# Patient Record
Sex: MALE | Race: OTHER | HISPANIC OR LATINO
[De-identification: names, ages, dates, MRNs, and addresses within clinical notes are randomized per-mention and may not be internally consistent; named-entity substitution may affect disease eponyms.]

---

## 2019-08-14 ENCOUNTER — RESULT CHARGE (OUTPATIENT)
Age: 44
End: 2019-08-14

## 2019-08-15 ENCOUNTER — OUTPATIENT (OUTPATIENT)
Dept: OUTPATIENT SERVICES | Facility: HOSPITAL | Age: 44
LOS: 1 days | End: 2019-08-15
Payer: SELF-PAY

## 2019-08-15 ENCOUNTER — APPOINTMENT (OUTPATIENT)
Dept: FAMILY MEDICINE | Facility: HOSPITAL | Age: 44
End: 2019-08-15

## 2019-08-15 VITALS
BODY MASS INDEX: 25.11 KG/M2 | RESPIRATION RATE: 16 BRPM | OXYGEN SATURATION: 98 % | DIASTOLIC BLOOD PRESSURE: 78 MMHG | TEMPERATURE: 98.3 F | SYSTOLIC BLOOD PRESSURE: 124 MMHG | HEART RATE: 88 BPM | WEIGHT: 144 LBS

## 2019-08-15 DIAGNOSIS — Z00.00 ENCOUNTER FOR GENERAL ADULT MEDICAL EXAMINATION WITHOUT ABNORMAL FINDINGS: ICD-10-CM

## 2019-08-15 PROCEDURE — 93005 ELECTROCARDIOGRAM TRACING: CPT

## 2019-08-15 PROCEDURE — G0463: CPT

## 2019-08-15 PROCEDURE — 36415 COLL VENOUS BLD VENIPUNCTURE: CPT

## 2019-08-15 NOTE — COUNSELING
[Risk of tobacco use and health benefits of smoking cessation discussed] : Risk of tobacco use and health benefits of smoking cessation discussed [Tobacco Use Cessation Intermediate Greater Than 3 Minutes Up to 10 Minutes] : Tobacco Use Cessation Intermediate Greater Than 3 Minutes Up to 10 Minutes [Encouraged to pick a quit date and identify support needed to quit] : Encouraged to pick a quit date and identify support needed to quit [FreeTextEntry1] : 8

## 2019-08-15 NOTE — REVIEW OF SYSTEMS
[Itching] : itching [Redness] : redness [Chest Pain] : chest pain [Joint Pain] : joint pain [Joint Stiffness] : joint stiffness [Headache] : headache [Dizziness] : dizziness [Negative] : Heme/Lymph [Palpitations] : no palpitations [Discharge] : no discharge [Lower Ext Edema] : no lower extremity edema [Leg Claudication] : no leg claudication [Orthopnea] : no orthopnea [Joint Swelling] : no joint swelling [Muscle Weakness] : no muscle weakness [Muscle Pain] : no muscle pain [Back Pain] : no back pain [Fainting] : no fainting [Confusion] : no confusion [Memory Loss] : no memory loss [Unsteady Walking] : no ataxia [FreeTextEntry5] : (see HPI) [FreeTextEntry3] : (see HPI)

## 2019-08-15 NOTE — HISTORY OF PRESENT ILLNESS
[FreeTextEntry8] : 43M w/o significant PMHx presenting w/ headache and joint pain. Headache started 6 months ago, occurs almost every day, lasts about 2 hours and is typically in morning. Headache starts in the bilateral temples and feels like a band to the back of the head. Takes advil 200 mg occasionally for the pain. Pt denies any visual changes associated with headaches. Pain does not wake him up from sleep. 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 Pt denies fever, night sweats, weight loss. \par \par Pt does report occasional pruritus w/ blurry vision lasting a few seconds. Associated with eye redness as well. \par \par Pt reports a few episodes of chest pain this week since he started smoking again. No active pain currently. Pain is retrosternal and pressure-like without radiation. The episodes occurred after long walking. Pt unsure if relieved by rest, but the pain subsided after half an hour. No associated diaphoresis or nausea. No family hx of heart disease or heart attacks.  independent

## 2019-08-15 NOTE — ASSESSMENT
[FreeTextEntry1] : 43M w/o significant PMHx presenting w/ headache, joint pain, episodic chest pain.

## 2019-08-15 NOTE — PLAN
[FreeTextEntry1] : #Tension headache\par - no red flags\par - advised ibuprofen 600 mg for headache, taking at start of headache is better than waiting for headache to get worse\par - discussed stretches\par - discussed relaxation techniques\par - discussed avoiding aggravating work if possible \par \par #Chest pain \par - atypical\par - EKG revealed sinus bradycardia, resolution of incomplete RBBB as compared to 2014 EKG, borderline 1st degree block \par - echo ordered\par - cardiology referral given\par - discussed w/ pt to go to ED if pt has chest pain \par - CBC, A1c, lipids, CMP \par \par #Joint pains\par - secondary to work \par - ibuprofen PRN \par - discussed avoiding aggravating work if possible \par \par #HCM \par - pt refusing tdap\par - HIV done today \par \par - RTC in 1 month for CPE and f/u of headache and chest pain \par \par d/w Dr. Munoz

## 2019-08-15 NOTE — PHYSICAL EXAM
[No Acute Distress] : no acute distress [Well Developed] : well developed [Well Nourished] : well nourished [Well-Appearing] : well-appearing [PERRL] : pupils equal round and reactive to light [Normal Outer Ear/Nose] : the outer ears and nose were normal in appearance [EOMI] : extraocular movements intact [Normal Oropharynx] : the oropharynx was normal [No Respiratory Distress] : no respiratory distress  [No Accessory Muscle Use] : no accessory muscle use [Clear to Auscultation] : lungs were clear to auscultation bilaterally [Normal Rate] : normal rate  [Regular Rhythm] : with a regular rhythm [Normal S1, S2] : normal S1 and S2 [No Murmur] : no murmur heard [No Carotid Bruits] : no carotid bruits [No Varicosities] : no varicosities [No Abdominal Bruit] : a ~M bruit was not heard ~T in the abdomen [Pedal Pulses Present] : the pedal pulses are present [No Edema] : there was no peripheral edema [No Palpable Aorta] : no palpable aorta [No Extremity Clubbing/Cyanosis] : no extremity clubbing/cyanosis [Soft] : abdomen soft [Non-distended] : non-distended [No Masses] : no abdominal mass palpated [Non Tender] : non-tender [No HSM] : no HSM [Normal Bowel Sounds] : normal bowel sounds [Normal Posterior Cervical Nodes] : no posterior cervical lymphadenopathy [Normal Anterior Cervical Nodes] : no anterior cervical lymphadenopathy [No CVA Tenderness] : no CVA  tenderness [No Spinal Tenderness] : no spinal tenderness [No Joint Swelling] : no joint swelling [Grossly Normal Strength/Tone] : grossly normal strength/tone [No Rash] : no rash [Coordination Grossly Intact] : coordination grossly intact [No Focal Deficits] : no focal deficits [Normal Gait] : normal gait [Normal Affect] : the affect was normal [Normal Insight/Judgement] : insight and judgment were intact [de-identified] : +bilateral ptergium

## 2019-08-16 DIAGNOSIS — G44.209 TENSION-TYPE HEADACHE, UNSPECIFIED, NOT INTRACTABLE: ICD-10-CM

## 2019-08-16 DIAGNOSIS — M25.50 PAIN IN UNSPECIFIED JOINT: ICD-10-CM

## 2019-08-16 DIAGNOSIS — R07.9 CHEST PAIN, UNSPECIFIED: ICD-10-CM

## 2019-08-16 LAB
ALBUMIN SERPL ELPH-MCNC: 4.9 G/DL
ALP BLD-CCNC: 82 U/L
ALT SERPL-CCNC: 49 U/L
ANION GAP SERPL CALC-SCNC: 13 MMOL/L
AST SERPL-CCNC: 27 U/L
BASOPHILS # BLD AUTO: 0.05 K/UL
BASOPHILS NFR BLD AUTO: 0.7 %
BILIRUB SERPL-MCNC: 0.7 MG/DL
BUN SERPL-MCNC: 22 MG/DL
CALCIUM SERPL-MCNC: 10.2 MG/DL
CHLORIDE SERPL-SCNC: 101 MMOL/L
CHOLEST SERPL-MCNC: 275 MG/DL
CHOLEST/HDLC SERPL: 3.5 RATIO
CO2 SERPL-SCNC: 27 MMOL/L
CREAT SERPL-MCNC: 0.94 MG/DL
EOSINOPHIL # BLD AUTO: 0.16 K/UL
EOSINOPHIL NFR BLD AUTO: 2.1 %
ESTIMATED AVERAGE GLUCOSE: 108 MG/DL
GLUCOSE SERPL-MCNC: 98 MG/DL
HBA1C MFR BLD HPLC: 5.4 %
HCT VFR BLD CALC: 51.5 %
HDLC SERPL-MCNC: 78 MG/DL
HGB BLD-MCNC: 17.3 G/DL
HIV1+2 AB SPEC QL IA.RAPID: NONREACTIVE
IMM GRANULOCYTES NFR BLD AUTO: 0.3 %
LDLC SERPL CALC-MCNC: 175 MG/DL
LYMPHOCYTES # BLD AUTO: 2.45 K/UL
LYMPHOCYTES NFR BLD AUTO: 32.6 %
MAN DIFF?: NORMAL
MCHC RBC-ENTMCNC: 31.3 PG
MCHC RBC-ENTMCNC: 33.6 GM/DL
MCV RBC AUTO: 93.3 FL
MONOCYTES # BLD AUTO: 0.82 K/UL
MONOCYTES NFR BLD AUTO: 10.9 %
NEUTROPHILS # BLD AUTO: 4.02 K/UL
NEUTROPHILS NFR BLD AUTO: 53.4 %
PLATELET # BLD AUTO: 329 K/UL
POTASSIUM SERPL-SCNC: 4.7 MMOL/L
PROT SERPL-MCNC: 7.9 G/DL
RBC # BLD: 5.52 M/UL
RBC # FLD: 13 %
SODIUM SERPL-SCNC: 141 MMOL/L
TRIGL SERPL-MCNC: 111 MG/DL
WBC # FLD AUTO: 7.52 K/UL

## 2019-08-20 ENCOUNTER — OUTPATIENT (OUTPATIENT)
Dept: OUTPATIENT SERVICES | Facility: HOSPITAL | Age: 44
LOS: 1 days | End: 2019-08-20
Payer: SELF-PAY

## 2019-08-20 DIAGNOSIS — R07.9 CHEST PAIN, UNSPECIFIED: ICD-10-CM

## 2019-08-20 DIAGNOSIS — Z00.00 ENCOUNTER FOR GENERAL ADULT MEDICAL EXAMINATION WITHOUT ABNORMAL FINDINGS: ICD-10-CM

## 2019-08-20 PROCEDURE — 93306 TTE W/DOPPLER COMPLETE: CPT

## 2019-09-19 ENCOUNTER — FORM ENCOUNTER (OUTPATIENT)
Age: 44
End: 2019-09-19

## 2019-09-20 ENCOUNTER — OUTPATIENT (OUTPATIENT)
Dept: OUTPATIENT SERVICES | Facility: HOSPITAL | Age: 44
LOS: 1 days | End: 2019-09-20
Payer: SELF-PAY

## 2019-09-20 ENCOUNTER — APPOINTMENT (OUTPATIENT)
Dept: CARDIOLOGY | Facility: HOSPITAL | Age: 44
End: 2019-09-20
Payer: SELF-PAY

## 2019-09-20 VITALS
DIASTOLIC BLOOD PRESSURE: 70 MMHG | RESPIRATION RATE: 15 BRPM | WEIGHT: 144 LBS | SYSTOLIC BLOOD PRESSURE: 124 MMHG | HEART RATE: 84 BPM | BODY MASS INDEX: 25.11 KG/M2 | TEMPERATURE: 97.8 F | OXYGEN SATURATION: 99 %

## 2019-09-20 DIAGNOSIS — Z00.00 ENCOUNTER FOR GENERAL ADULT MEDICAL EXAMINATION WITHOUT ABNORMAL FINDINGS: ICD-10-CM

## 2019-09-20 PROCEDURE — 71047 X-RAY EXAM CHEST 3 VIEWS: CPT | Mod: 26

## 2019-09-23 DIAGNOSIS — E78.5 HYPERLIPIDEMIA, UNSPECIFIED: ICD-10-CM

## 2019-09-23 DIAGNOSIS — R07.9 CHEST PAIN, UNSPECIFIED: ICD-10-CM

## 2019-09-23 PROCEDURE — 93017 CV STRESS TEST TRACING ONLY: CPT

## 2019-09-23 PROCEDURE — 93018 CV STRESS TEST I&R ONLY: CPT

## 2019-09-23 PROCEDURE — 71047 X-RAY EXAM CHEST 3 VIEWS: CPT

## 2019-09-23 PROCEDURE — G0463: CPT

## 2019-09-23 PROCEDURE — 93016 CV STRESS TEST SUPVJ ONLY: CPT

## 2019-09-27 ENCOUNTER — OUTPATIENT (OUTPATIENT)
Dept: OUTPATIENT SERVICES | Facility: HOSPITAL | Age: 44
LOS: 1 days | End: 2019-09-27
Payer: SELF-PAY

## 2019-09-27 DIAGNOSIS — Z87.891 PERSONAL HISTORY OF NICOTINE DEPENDENCE: ICD-10-CM

## 2019-09-27 PROCEDURE — 94640 AIRWAY INHALATION TREATMENT: CPT

## 2019-09-27 PROCEDURE — 94060 EVALUATION OF WHEEZING: CPT

## 2019-09-27 RX ORDER — ALBUTEROL 90 UG/1
2.5 AEROSOL, METERED ORAL ONCE
Refills: 0 | Status: COMPLETED | OUTPATIENT
Start: 2019-09-27 | End: 2019-09-27

## 2019-09-27 RX ADMIN — ALBUTEROL 2.5 MILLIGRAM(S): 90 AEROSOL, METERED ORAL at 14:01

## 2019-09-30 ENCOUNTER — APPOINTMENT (OUTPATIENT)
Dept: FAMILY MEDICINE | Facility: HOSPITAL | Age: 44
End: 2019-09-30

## 2019-09-30 ENCOUNTER — OUTPATIENT (OUTPATIENT)
Dept: OUTPATIENT SERVICES | Facility: HOSPITAL | Age: 44
LOS: 1 days | End: 2019-09-30
Payer: SELF-PAY

## 2019-09-30 VITALS
DIASTOLIC BLOOD PRESSURE: 65 MMHG | TEMPERATURE: 97.7 F | HEART RATE: 55 BPM | SYSTOLIC BLOOD PRESSURE: 109 MMHG | BODY MASS INDEX: 24.93 KG/M2 | WEIGHT: 143 LBS | OXYGEN SATURATION: 99 % | RESPIRATION RATE: 15 BRPM

## 2019-09-30 DIAGNOSIS — Z80.1 FAMILY HISTORY OF MALIGNANT NEOPLASM OF TRACHEA, BRONCHUS AND LUNG: ICD-10-CM

## 2019-09-30 DIAGNOSIS — Z87.891 PERSONAL HISTORY OF NICOTINE DEPENDENCE: ICD-10-CM

## 2019-09-30 DIAGNOSIS — Z82.49 FAMILY HISTORY OF ISCHEMIC HEART DISEASE AND OTHER DISEASES OF THE CIRCULATORY SYSTEM: ICD-10-CM

## 2019-09-30 DIAGNOSIS — Z00.00 ENCOUNTER FOR GENERAL ADULT MEDICAL EXAMINATION WITHOUT ABNORMAL FINDINGS: ICD-10-CM

## 2019-09-30 PROCEDURE — 86038 ANTINUCLEAR ANTIBODIES: CPT

## 2019-09-30 PROCEDURE — G0463: CPT

## 2019-09-30 PROCEDURE — 36415 COLL VENOUS BLD VENIPUNCTURE: CPT

## 2019-09-30 NOTE — PLAN
[FreeTextEntry1] : #Tension Headache\par - continue stretches, relaxation techniques\par - continue advil as needed\par \par #Chest pain\par - stress test per pt was normal\par - f/u cardio \par - discussed going to ED if pt has chest pain \par \par #Severe pulmonary hypertension\par - h/o smoking\par - CXR (9/20/19) wnl\par - PFTs results pending - per cardio\par - pulmonology referral given \par - MARIOLA ordered\par \par #Far-sighted\par - ophthalmology referral given\par \par #Smoking cessation\par - discussed completely stopping tobacco as he only uses it\par - encouraged pt as he is no longer chemically dependent on it \par \par #HCM \par - tdap given today \par - pt deferred flu shot and pneumovax\par - dental referral given \par - screenings up to date\par \par RTC in 2 months for f/u severe pulmonary hypertension workup

## 2019-09-30 NOTE — PHYSICAL EXAM
[No Acute Distress] : no acute distress [Well Nourished] : well nourished [Well Developed] : well developed [Well-Appearing] : well-appearing [Normal Sclera/Conjunctiva] : normal sclera/conjunctiva [PERRL] : pupils equal round and reactive to light [EOMI] : extraocular movements intact [Normal Outer Ear/Nose] : the outer ears and nose were normal in appearance [Normal Oropharynx] : the oropharynx was normal [No JVD] : no jugular venous distention [No Lymphadenopathy] : no lymphadenopathy [Supple] : supple [Thyroid Normal, No Nodules] : the thyroid was normal and there were no nodules present [No Respiratory Distress] : no respiratory distress  [No Accessory Muscle Use] : no accessory muscle use [Clear to Auscultation] : lungs were clear to auscultation bilaterally [Normal Rate] : normal rate  [Regular Rhythm] : with a regular rhythm [Normal S1, S2] : normal S1 and S2 [No Murmur] : no murmur heard [Soft] : abdomen soft [Non-distended] : non-distended [Non Tender] : non-tender [No HSM] : no HSM [Normal Bowel Sounds] : normal bowel sounds [Normal Posterior Cervical Nodes] : no posterior cervical lymphadenopathy [No CVA Tenderness] : no CVA  tenderness [Normal Anterior Cervical Nodes] : no anterior cervical lymphadenopathy [No Joint Swelling] : no joint swelling [No Spinal Tenderness] : no spinal tenderness [Grossly Normal Strength/Tone] : grossly normal strength/tone [Normal Gait] : normal gait [No Focal Deficits] : no focal deficits [Coordination Grossly Intact] : coordination grossly intact [Normal Affect] : the affect was normal [Normal Insight/Judgement] : insight and judgment were intact

## 2019-09-30 NOTE — HISTORY OF PRESENT ILLNESS
[de-identified] : 43M w/ headache, HLD, occasional chest pain presenting for CPE. Pt went to cardio and has stress pending. Echo revealed severe pulmonary hypertension. CXR revealed no acute pathology. No acute chest pain currently, but pt still occasionally gets chest pain. No change in pain or quality. Pt had PFTs done 3 days ago and stress test done and was told it was good. Headaches much better. \par \par Pt reports he wants to be an organ donor and that he has talked to his wife about it.

## 2019-09-30 NOTE — HEALTH RISK ASSESSMENT
[21-25] : 21-25 [] : Yes [No falls in past year] : Patient reported no falls in the past year [With Significant Other] : lives with significant other [# of Members in Household ___] :  household currently consist of [unfilled] member(s) [Sexually Active] : sexually active [] :  [Employed] : employed [Fully functional (bathing, dressing, toileting, transferring, walking, feeding)] : Fully functional (bathing, dressing, toileting, transferring, walking, feeding) [Feels Safe at Home] : Feels safe at home [Fully functional (using the telephone, shopping, preparing meals, housekeeping, doing laundry, using] : Fully functional and needs no help or supervision to perform IADLs (using the telephone, shopping, preparing meals, housekeeping, doing laundry, using transportation, managing medications and managing finances) [Reports changes in hearing] : Reports changes in hearing [Reports changes in vision] : Reports changes in vision [Smoke Detector] : smoke detector [Seat Belt] :  uses seat belt [With Patient/Caregiver] : With Patient/Caregiver [Designated Healthcare Proxy] : Designated healthcare proxy [Name: ___] : Health Care Proxy's Name: [unfilled]  [Relationship: ___] : Relationship: [unfilled] [Aggressive treatment] : aggressive treatment [de-identified] : occasionally smokes 1 cigarette w/ friends  [YearQuit] : 2 [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

## 2019-10-01 DIAGNOSIS — I27.20 PULMONARY HYPERTENSION, UNSPECIFIED: ICD-10-CM

## 2019-10-01 DIAGNOSIS — Z23 ENCOUNTER FOR IMMUNIZATION: ICD-10-CM

## 2019-10-01 DIAGNOSIS — H52.00 HYPERMETROPIA, UNSPECIFIED EYE: ICD-10-CM

## 2019-10-04 LAB — ANA SER IF-ACNC: NEGATIVE

## 2019-10-21 ENCOUNTER — APPOINTMENT (OUTPATIENT)
Dept: OPHTHALMOLOGY | Facility: CLINIC | Age: 44
End: 2019-10-21

## 2019-10-21 ENCOUNTER — OUTPATIENT (OUTPATIENT)
Dept: OUTPATIENT SERVICES | Facility: HOSPITAL | Age: 44
LOS: 1 days | End: 2019-10-21

## 2019-10-21 ENCOUNTER — APPOINTMENT (OUTPATIENT)
Dept: FAMILY MEDICINE | Facility: HOSPITAL | Age: 44
End: 2019-10-21

## 2019-10-21 DIAGNOSIS — E78.5 HYPERLIPIDEMIA, UNSPECIFIED: ICD-10-CM

## 2019-10-21 PROCEDURE — G0463: CPT

## 2019-10-22 DIAGNOSIS — Z00.00 ENCOUNTER FOR GENERAL ADULT MEDICAL EXAMINATION WITHOUT ABNORMAL FINDINGS: ICD-10-CM

## 2019-10-22 DIAGNOSIS — Z23 ENCOUNTER FOR IMMUNIZATION: ICD-10-CM

## 2019-10-22 DIAGNOSIS — I27.20 PULMONARY HYPERTENSION, UNSPECIFIED: ICD-10-CM

## 2019-10-22 DIAGNOSIS — H52.00 HYPERMETROPIA, UNSPECIFIED EYE: ICD-10-CM

## 2019-11-13 ENCOUNTER — APPOINTMENT (OUTPATIENT)
Dept: PULMONOLOGY | Facility: CLINIC | Age: 44
End: 2019-11-13
Payer: COMMERCIAL

## 2019-11-13 VITALS
BODY MASS INDEX: 24.45 KG/M2 | RESPIRATION RATE: 16 BRPM | HEIGHT: 63 IN | OXYGEN SATURATION: 98 % | HEART RATE: 66 BPM | WEIGHT: 138 LBS | DIASTOLIC BLOOD PRESSURE: 65 MMHG | SYSTOLIC BLOOD PRESSURE: 113 MMHG | TEMPERATURE: 98 F

## 2019-11-13 PROCEDURE — 94729 DIFFUSING CAPACITY: CPT

## 2019-11-13 PROCEDURE — 94726 PLETHYSMOGRAPHY LUNG VOLUMES: CPT

## 2019-11-13 PROCEDURE — 94060 EVALUATION OF WHEEZING: CPT

## 2019-11-13 PROCEDURE — ZZZZZ: CPT

## 2019-11-13 PROCEDURE — 99203 OFFICE O/P NEW LOW 30 MIN: CPT | Mod: 25

## 2019-11-13 NOTE — ASSESSMENT
[FreeTextEntry1] : 42 yo man with hyperlipidemia and former smoker referred from [facility] for evaluation of pulmonary hypertension. \par \par 1. 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 but otherwise no signs or symptoms suggestive of connective tissue diseases\par - No history of drug use, stimulants or weight loss medications\par - HIV negative\par - No history of PE/DVT\par - No history of renal or liver disease\par \par Plan:\par - Cardiology referral for right /left heart cath to confirm diagnosis of pulmonary hypertension\par - Based on results, will decide on further work up including rheumatologic serologies and V/Q scan to exclude potential causes of PH\par - He does have polycythemia - reports smoking history but not hypoxic on exam and did not live in high altitudes back in [facility] - will get oximetry during right heart cath as well and consider further testing with EPO and JAK2 mutation next visit\par Does not have any signs or symptoms  of PAH

## 2019-11-13 NOTE — PHYSICAL EXAM
[General Appearance - Well Developed] : well developed [Normal Appearance] : normal appearance [Normal Conjunctiva] : the conjunctiva exhibited no abnormalities [Neck Appearance] : the appearance of the neck was normal [Jugular Venous Distention Increased] : there was no jugular-venous distention [Heart Rate And Rhythm] : heart rate and rhythm were normal [Heart Sounds] : normal S1 and S2 [Murmurs] : no murmurs present [Edema] : no peripheral edema present [Bowel Sounds] : normal bowel sounds [Auscultation Breath Sounds / Voice Sounds] : lungs were clear to auscultation bilaterally [Abdomen Soft] : soft [Nail Clubbing] : no clubbing of the fingernails [Abnormal Walk] : normal gait [Abdomen Tenderness] : non-tender [] : no rash [Cyanosis, Localized] : no localized cyanosis [No Focal Deficits] : no focal deficits [Oriented To Time, Place, And Person] : oriented to person, place, and time [Normal Oropharynx] : abnormal oropharynx [Erythema] : no erythema of the pharynx

## 2019-11-13 NOTE — REVIEW OF SYSTEMS
[Myalgias] : myalgias [Arthralgias] : arthralgias [Negative] : Sleep Disorder [Fever] : no fever [Chills] : no chills [Fatigue] : no fatigue [Poor Appetite] : normal appetite  [Dry Eyes] : no dryness of the eyes [Recent Wt Loss (___ Lbs)] : no recent weight loss [Eye Irritation] : no ~T irritation of the eyes [Nasal Congestion] : no nasal congestion [Ear Disturbance] : no ear disturbance [Epistaxis] : no nosebleeds [Sinus Problems] : no sinus problems [Postnasal Drip] : no postnasal drip [Dry Mouth] : no dry mouth [Mouth Ulcers] : no mouth ulcers [Sore Throat] : no sore throat [Chest Discomfort] : no chest discomfort [Hypotension] : no hypotension [Hypertension] : no ~T hypertension [Murmurs] : no murmurs were heard [Dysrhythmia] : no dysrhythmia [Orthopnea] : no orthopnea [PND] : no PND [Palpitations] : no palpitations [Claudication] : no intermittent claudication [Edema] : ~T edema was not present [Phlebitis] : no thrombophlebitis [Heartburn] : no heartburn [Leg Cramps] : no leg cramps [Indigestion] : no indigestion [Dysphagia] : no dysphagia [Reflux] : no reflux [Nausea] : no nausea [Constipation] : no constipation [Vomiting] : no vomiting [Diarrhea] : no diarrhea [Nocturia] : no nocturia [Abdominal Pain] : no abdominal pain [Urgency] : no feelings of urinary urgency [Frequency] : no change in urinary frequency [Dysuria] : no dysuria [Ulcerations] : no ulcerations [Rash] : no [unfilled] rash [Raynaud] : no Raynaud's phenomenon was observed [Anemia] : no anemia [Easy Bruising] : no ~M tendency for easy bruising [Clotting Disorder] : no clotting disorder [Headache] : no headache [Dizziness] : no dizziness [Syncope] : no fainting [Depression] : no depression [Anxiety] : no anxiety [Thyroid Problem] : no thyroid problem [Diabetes] : no diabetes mellitus [Diet Meds] : not taking dietary supplements [Rheumatologic] : no ~T rheumatologic disorder [HIV] : no HIV infection [Vasculitis] : no ~T vasculitides [DVT] : no DVT [Hepatic Disease] : no hepatic disease [Pulmonary Embolism] : no pulmonary embolism [Family History of PH] : no family history of pulmonary hypertension

## 2019-11-13 NOTE — HISTORY OF PRESENT ILLNESS
[FreeTextEntry1] : 44 yo man with hyperlipidemia and former smoker referred from [facility] for evaluation of pulmonary hypertension. He was initially seen for evaluation of chest pain. Was evaluated by cardiology and echocardiogram revealed estimated RVSP 65 mmHg with no evidence of left heart dysfunction. Apart from intermittent chest pain, reports no dyspnea on exertion. No signs or symptoms suggestive of connective tissue diseases. History of smoking but no known COPD. \par \par Smoking history: 1 pack per day for about 20 years then quit for 5 years and resumed for about 5 years. Quit 1 year ago\par Alcohol use: occasional\par Drug use: negative\par \par

## 2019-12-11 ENCOUNTER — OUTPATIENT (OUTPATIENT)
Dept: OUTPATIENT SERVICES | Facility: HOSPITAL | Age: 44
LOS: 1 days | End: 2019-12-11
Payer: SELF-PAY

## 2019-12-11 VITALS
DIASTOLIC BLOOD PRESSURE: 58 MMHG | TEMPERATURE: 98 F | OXYGEN SATURATION: 97 % | RESPIRATION RATE: 18 BRPM | HEIGHT: 63 IN | SYSTOLIC BLOOD PRESSURE: 110 MMHG | HEART RATE: 68 BPM | WEIGHT: 145.06 LBS

## 2019-12-11 DIAGNOSIS — I27.20 PULMONARY HYPERTENSION, UNSPECIFIED: ICD-10-CM

## 2019-12-11 LAB
ALBUMIN SERPL ELPH-MCNC: 4.3 G/DL — SIGNIFICANT CHANGE UP (ref 3.3–5)
ALP SERPL-CCNC: 92 U/L — SIGNIFICANT CHANGE UP (ref 40–120)
ALT FLD-CCNC: 52 U/L — HIGH (ref 10–45)
ANION GAP SERPL CALC-SCNC: 15 MMOL/L — SIGNIFICANT CHANGE UP (ref 5–17)
AST SERPL-CCNC: 25 U/L — SIGNIFICANT CHANGE UP (ref 10–40)
BILIRUB SERPL-MCNC: 0.5 MG/DL — SIGNIFICANT CHANGE UP (ref 0.2–1.2)
BUN SERPL-MCNC: 28 MG/DL — HIGH (ref 7–23)
CALCIUM SERPL-MCNC: 9.7 MG/DL — SIGNIFICANT CHANGE UP (ref 8.4–10.5)
CHLORIDE SERPL-SCNC: 102 MMOL/L — SIGNIFICANT CHANGE UP (ref 96–108)
CO2 SERPL-SCNC: 23 MMOL/L — SIGNIFICANT CHANGE UP (ref 22–31)
CREAT SERPL-MCNC: 0.82 MG/DL — SIGNIFICANT CHANGE UP (ref 0.5–1.3)
GLUCOSE SERPL-MCNC: 94 MG/DL — SIGNIFICANT CHANGE UP (ref 70–99)
HCT VFR BLD CALC: 44.4 % — SIGNIFICANT CHANGE UP (ref 39–50)
HGB BLD-MCNC: 14.8 G/DL — SIGNIFICANT CHANGE UP (ref 13–17)
MCHC RBC-ENTMCNC: 30.6 PG — SIGNIFICANT CHANGE UP (ref 27–34)
MCHC RBC-ENTMCNC: 33.3 GM/DL — SIGNIFICANT CHANGE UP (ref 32–36)
MCV RBC AUTO: 91.7 FL — SIGNIFICANT CHANGE UP (ref 80–100)
NRBC # BLD: 0 /100 WBCS — SIGNIFICANT CHANGE UP (ref 0–0)
PLATELET # BLD AUTO: 289 K/UL — SIGNIFICANT CHANGE UP (ref 150–400)
POTASSIUM SERPL-MCNC: 3.9 MMOL/L — SIGNIFICANT CHANGE UP (ref 3.5–5.3)
POTASSIUM SERPL-SCNC: 3.9 MMOL/L — SIGNIFICANT CHANGE UP (ref 3.5–5.3)
PROT SERPL-MCNC: 7.6 G/DL — SIGNIFICANT CHANGE UP (ref 6–8.3)
RBC # BLD: 4.84 M/UL — SIGNIFICANT CHANGE UP (ref 4.2–5.8)
RBC # FLD: 12.1 % — SIGNIFICANT CHANGE UP (ref 10.3–14.5)
SODIUM SERPL-SCNC: 140 MMOL/L — SIGNIFICANT CHANGE UP (ref 135–145)
WBC # BLD: 6.91 K/UL — SIGNIFICANT CHANGE UP (ref 3.8–10.5)
WBC # FLD AUTO: 6.91 K/UL — SIGNIFICANT CHANGE UP (ref 3.8–10.5)

## 2019-12-11 PROCEDURE — C1894: CPT

## 2019-12-11 PROCEDURE — 93005 ELECTROCARDIOGRAM TRACING: CPT

## 2019-12-11 PROCEDURE — 93451 RIGHT HEART CATH: CPT | Mod: 26

## 2019-12-11 PROCEDURE — 93451 RIGHT HEART CATH: CPT

## 2019-12-11 PROCEDURE — 85027 COMPLETE CBC AUTOMATED: CPT

## 2019-12-11 PROCEDURE — 80053 COMPREHEN METABOLIC PANEL: CPT

## 2019-12-11 PROCEDURE — C1769: CPT

## 2019-12-11 PROCEDURE — 93010 ELECTROCARDIOGRAM REPORT: CPT

## 2019-12-11 NOTE — H&P CARDIOLOGY - HISTORY OF PRESENT ILLNESS
45 yo male with h/o HLD, smoker (reports 20yrs of smoking, quit and started again, now smoking ~1-2 cigarettes/week), referred to cardiology clinic for complaints of chest pain, and for result of most recent echocardiogram. Pt initial complaint of chest pain was ~1 month ago. Pt was evaluated at  clinic at that time, EKG obtained with sinus bradycardia, borderline 1st degree block. Pt was sent for subsequent echo. He reports occasional feeling of chest pain, especially after coming back from work. Pt works in construction. He described the chest pain as pressure-like, and with radiation to the neck and left arm.   Echo on 8/20/19 revealed findings consistent with severe pulmonary hypertension, also showed mildly enlarged RV size, mildly enlarged R atrium. Pt was seen by Dr Bender Pulmonary for severe Pulmonary HTN. Pt was referred for RHC by DR Bender. 43 yo male with h/o HLD (no meds) , referred to cardiology clinic for complaints of chest pain, and for result of most recent echocardiogram. Pt initial complaint of chest pain was ~1 month ago. Pt was evaluated at  clinic at that time, EKG obtained with sinus bradycardia, borderline 1st degree block. Pt was sent for subsequent echo. He reports occasional feeling of chest pain, especially after coming back from work. Pt works in construction. He described the chest pain as pressure-like, and with radiation to the neck and left arm. Pt denies any implantable devices.   Echo on 8/20/19 revealed findings consistent with severe pulmonary hypertension, also showed mildly enlarged RV size, mildly enlarged R atrium. Pt was seen by Maurilio Encinas (Pulmonary) for severe Pulmonary HTN. Pt was referred for RHC by DR Bender.

## 2019-12-12 ENCOUNTER — TRANSCRIPTION ENCOUNTER (OUTPATIENT)
Age: 44
End: 2019-12-12

## 2019-12-12 PROBLEM — E78.5 HYPERLIPIDEMIA, UNSPECIFIED: Chronic | Status: ACTIVE | Noted: 2019-12-11

## 2020-01-08 ENCOUNTER — APPOINTMENT (OUTPATIENT)
Dept: PULMONOLOGY | Facility: CLINIC | Age: 45
End: 2020-01-08
Payer: COMMERCIAL

## 2020-01-08 VITALS
DIASTOLIC BLOOD PRESSURE: 70 MMHG | TEMPERATURE: 97.9 F | BODY MASS INDEX: 25.34 KG/M2 | WEIGHT: 143 LBS | HEIGHT: 63 IN | SYSTOLIC BLOOD PRESSURE: 114 MMHG | RESPIRATION RATE: 15 BRPM | HEART RATE: 60 BPM

## 2020-01-08 DIAGNOSIS — I27.20 PULMONARY HYPERTENSION, UNSPECIFIED: ICD-10-CM

## 2020-01-08 PROCEDURE — 99213 OFFICE O/P EST LOW 20 MIN: CPT | Mod: GC

## 2020-01-08 NOTE — REVIEW OF SYSTEMS
[Chest Tightness] : chest tightness [Chest Discomfort] : chest discomfort [Reflux] : reflux [Headache] : headache [Fever] : no fever [Fatigue] : no fatigue [Chills] : no chills [Sinus Problems] : no sinus problems [Nasal Congestion] : no nasal congestion [Postnasal Drip] : no postnasal drip [Cough] : no cough [Sputum] : not coughing up ~M sputum [Sore Throat] : no sore throat [Hemoptysis] : no hemoptysis [Dyspnea] : no dyspnea [Orthopnea] : no orthopnea [Wheezing] : no wheezing [Pleuritic Pain] : no pleuritic pain [Palpitations] : no palpitations [Edema] : ~T edema was not present [Watery Eyes] : no discharge from the eyes [Nasal Discharge] : no nasal discharge [Nausea] : no nausea [Diarrhea] : no diarrhea [Abdominal Pain] : no abdominal pain [Vomiting] : no vomiting [Myalgias] : no myalgias [Dizziness] : no dizziness [Arthralgias] : no arthralgias [Seizures] : no seizures [Numbness] : no numbness

## 2020-01-08 NOTE — ASSESSMENT
[FreeTextEntry1] : This is a 45 y/o M former smoker with HLD who presents for evaluation of pulmonary hypertension from high estimated RVSP on TTE. RHC performed in December reveals PASP 25, mean pulmonary artery pressure 15, PCWP 8, consistent with normal pulmonary pressures.\par \par - based on RHC, patient does not have pulmonary hypertension\par - told to f/u with PCP for further workup of intermittent chest pain\par - told he can try OTC pepcid to treat symptoms of reflux\par - discussed lifestyle changes to help lower cholesterol, told to f/u with PCP for repeat lipid profile within the next several months

## 2020-01-08 NOTE — HISTORY OF PRESENT ILLNESS
[FreeTextEntry1] : Patient is a 43 y/o M former smoker with PMHx of HLD who presents for f/u for evaluation of pulmonary hypertension.\par \par Patient had TTE performed at [facility] showing normal left ventricular function, with mildly dilated RA and RV and estimated RVSP of 65 mmHg. He was sent here for evaluation last month. He had RHC performed in December which reveals normal pulmonary artery pressures.\par \par Since last visit, patient reports he is doing well. Continues to abstain from smoking. Has intermittent chest pain that is slightly improved. Describes pain as achy, like a muscle ache, and located substernally. Worse when lying down for sleep, but also has pain randomly at rest and also during exertion. Workup thus far has been nondiagnostic for cause of pain. Also reports recent history of URI, now resolved.

## 2020-01-08 NOTE — PHYSICAL EXAM
[General Appearance - Well Developed] : well developed [Normal Appearance] : normal appearance [Normal Conjunctiva] : the conjunctiva exhibited no abnormalities [Well Groomed] : well groomed [General Appearance - Well Nourished] : well nourished [Normal Oropharynx] : normal oropharynx [Neck Appearance] : the appearance of the neck was normal [Jugular Venous Distention Increased] : there was no jugular-venous distention [Neck Cervical Mass (___cm)] : no neck mass was observed [Heart Sounds] : normal S1 and S2 [Heart Rate And Rhythm] : heart rate and rhythm were normal [Murmurs] : no murmurs present [Edema] : no peripheral edema present [Arterial Pulses Normal] : the arterial pulses were normal [Auscultation Breath Sounds / Voice Sounds] : lungs were clear to auscultation bilaterally [Respiration, Rhythm And Depth] : normal respiratory rhythm and effort [Exaggerated Use Of Accessory Muscles For Inspiration] : no accessory muscle use [Abdomen Tenderness] : non-tender [Abdomen Soft] : soft [Abnormal Walk] : normal gait [Cyanosis, Localized] : no localized cyanosis [Nail Clubbing] : no clubbing of the fingernails [] : no rash [No Focal Deficits] : no focal deficits [Skin Turgor] : normal skin turgor [Oriented To Time, Place, And Person] : oriented to person, place, and time [Affect] : the affect was normal [Erythema] : no erythema of the pharynx

## 2020-02-12 ENCOUNTER — OUTPATIENT (OUTPATIENT)
Dept: OUTPATIENT SERVICES | Facility: HOSPITAL | Age: 45
LOS: 1 days | End: 2020-02-12
Payer: SELF-PAY

## 2020-02-12 ENCOUNTER — APPOINTMENT (OUTPATIENT)
Dept: FAMILY MEDICINE | Facility: HOSPITAL | Age: 45
End: 2020-02-12

## 2020-02-12 VITALS
TEMPERATURE: 97.5 F | SYSTOLIC BLOOD PRESSURE: 127 MMHG | WEIGHT: 141 LBS | RESPIRATION RATE: 16 BRPM | OXYGEN SATURATION: 98 % | BODY MASS INDEX: 24.98 KG/M2 | HEART RATE: 63 BPM | DIASTOLIC BLOOD PRESSURE: 70 MMHG

## 2020-02-12 DIAGNOSIS — Z00.00 ENCOUNTER FOR GENERAL ADULT MEDICAL EXAMINATION WITHOUT ABNORMAL FINDINGS: ICD-10-CM

## 2020-02-12 PROCEDURE — G0463: CPT

## 2020-02-12 NOTE — REVIEW OF SYSTEMS
[Chills] : no chills [Fever] : no fever [Palpitations] : no palpitations [Chest Pain] : no chest pain [Orthopena] : no orthopnea [Shortness Of Breath] : no shortness of breath [Cough] : no cough [Nausea] : no nausea [Abdominal Pain] : no abdominal pain [Vomiting] : no vomiting

## 2020-02-12 NOTE — ASSESSMENT
[FreeTextEntry1] : 43M w/ headache, HLD, occasional chest pain following up for intermittent chest pain

## 2020-02-12 NOTE — COUNSELING
[Risk of tobacco use and health benefits of smoking cessation discussed] : Risk of tobacco use and health benefits of smoking cessation discussed [Cessation strategies including cessation program discussed] : Cessation strategies including cessation program discussed [Use of nicotine replacement therapies and other medications discussed] : Use of nicotine replacement therapies and other medications discussed [Willing to Quit Smoking] : Willing to quit smoking [FreeTextEntry2] : pt has gone 3 months without smoking. does not want medication at this time. pt congratulated for cessation and encouraged to continue.

## 2020-02-12 NOTE — PLAN
[FreeTextEntry1] : #Chest pain\par - improving\par - stress test normal \par - likely MSK related \par - minimize risk via cholesterol control and smoking cessation \par \par #HLD\par - repeat LDL \par - pt would like lifestyle modifcations first\par - will repeat in 3 months after modifications before determining meds use \par \par #Severe pulmonary hypertension\par - false-positive\par - right heart cath revealed normal pressures\par \par #Smoking cessation\par - hasn't smoked for 3 months\par - supported and encouraged pt to continue cessation\par \par #HCM \par - pneumovax given today\par - pt deferred flu shot\par - screenings up to date\par \par RTC in 3 months for f/u HLD \par D/W Dr. Cortez\par

## 2020-02-12 NOTE — HISTORY OF PRESENT ILLNESS
[de-identified] : 43M w/ headache, HLD, occasional chest pain following up for intermittent chest pain. Chest pain is less frequent now, 3-4x/week. Pain feels like "leg pain after walking a lot", like "stretching". Occasionally upon waking and sometimes exertional and associated with stress.. Lasts 3 minutes and resolves with relaxation. No radiation. No associated diaphoresis, palpitations.

## 2020-02-13 DIAGNOSIS — F17.200 NICOTINE DEPENDENCE, UNSPECIFIED, UNCOMPLICATED: ICD-10-CM

## 2020-02-13 DIAGNOSIS — R07.9 CHEST PAIN, UNSPECIFIED: ICD-10-CM

## 2020-02-13 DIAGNOSIS — E78.5 HYPERLIPIDEMIA, UNSPECIFIED: ICD-10-CM

## 2020-06-18 ENCOUNTER — APPOINTMENT (OUTPATIENT)
Dept: FAMILY MEDICINE | Facility: HOSPITAL | Age: 45
End: 2020-06-18

## 2020-06-18 ENCOUNTER — OUTPATIENT (OUTPATIENT)
Dept: OUTPATIENT SERVICES | Facility: HOSPITAL | Age: 45
LOS: 1 days | End: 2020-06-18
Payer: SELF-PAY

## 2020-06-18 VITALS
BODY MASS INDEX: 25.86 KG/M2 | HEART RATE: 62 BPM | TEMPERATURE: 97.7 F | SYSTOLIC BLOOD PRESSURE: 134 MMHG | DIASTOLIC BLOOD PRESSURE: 82 MMHG | WEIGHT: 146 LBS | RESPIRATION RATE: 16 BRPM

## 2020-06-18 DIAGNOSIS — Z00.00 ENCOUNTER FOR GENERAL ADULT MEDICAL EXAMINATION WITHOUT ABNORMAL FINDINGS: ICD-10-CM

## 2020-06-18 PROCEDURE — 36415 COLL VENOUS BLD VENIPUNCTURE: CPT

## 2020-06-18 PROCEDURE — 87389 HIV-1 AG W/HIV-1&-2 AB AG IA: CPT

## 2020-06-18 PROCEDURE — G0463: CPT

## 2020-06-18 NOTE — PHYSICAL EXAM
[No Acute Distress] : no acute distress [EOMI] : extraocular movements intact [Well-Appearing] : well-appearing [No Respiratory Distress] : no respiratory distress  [No Accessory Muscle Use] : no accessory muscle use [Normal Rate] : normal rate  [Clear to Auscultation] : lungs were clear to auscultation bilaterally [Normal S1, S2] : normal S1 and S2 [Regular Rhythm] : with a regular rhythm [Pedal Pulses Present] : the pedal pulses are present [Normal Gait] : normal gait [Alert and Oriented x3] : oriented to person, place, and time [de-identified] : + [de-identified] : +LLE swelling; negative homans sign on left LLE, no calf tenderness on left LLE [de-identified] : No redness/warmth/discharge of LLE
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
## 2020-06-19 DIAGNOSIS — T63.441A TOXIC EFFECT OF VENOM OF BEES, ACCIDENTAL (UNINTENTIONAL), INITIAL ENCOUNTER: ICD-10-CM

## 2020-06-25 ENCOUNTER — APPOINTMENT (OUTPATIENT)
Dept: FAMILY MEDICINE | Facility: HOSPITAL | Age: 45
End: 2020-06-25

## 2020-06-25 ENCOUNTER — OUTPATIENT (OUTPATIENT)
Dept: OUTPATIENT SERVICES | Facility: HOSPITAL | Age: 45
LOS: 1 days | End: 2020-06-25
Payer: SELF-PAY

## 2020-06-25 VITALS
BODY MASS INDEX: 25.86 KG/M2 | WEIGHT: 146 LBS | TEMPERATURE: 97.6 F | OXYGEN SATURATION: 98 % | HEART RATE: 62 BPM | DIASTOLIC BLOOD PRESSURE: 71 MMHG | RESPIRATION RATE: 16 BRPM | SYSTOLIC BLOOD PRESSURE: 121 MMHG

## 2020-06-25 DIAGNOSIS — Z00.00 ENCOUNTER FOR GENERAL ADULT MEDICAL EXAMINATION WITHOUT ABNORMAL FINDINGS: ICD-10-CM

## 2020-06-25 PROCEDURE — G0463: CPT

## 2020-06-25 RX ORDER — PREDNISONE 20 MG/1
20 TABLET ORAL
Qty: 2 | Refills: 0 | Status: COMPLETED | COMMUNITY
Start: 2020-06-18 | End: 2020-06-25

## 2020-06-26 DIAGNOSIS — T63.441A TOXIC EFFECT OF VENOM OF BEES, ACCIDENTAL (UNINTENTIONAL), INITIAL ENCOUNTER: ICD-10-CM

## 2020-06-27 RX ORDER — IBUPROFEN 200 MG/1
TABLET, COATED ORAL EVERY 6 HOURS
Refills: 0 | Status: COMPLETED | COMMUNITY
End: 2020-06-27

## 2020-06-27 NOTE — REVIEW OF SYSTEMS
[Fever] : no fever [Chills] : no chills [Chest Pain] : no chest pain [Shortness Of Breath] : no shortness of breath [Cough] : no cough [Joint Pain] : no joint pain [Muscle Pain] : no muscle pain [Itching] : no itching [Skin Rash] : no skin rash

## 2020-06-27 NOTE — PLAN
[FreeTextEntry1] : #Bee sting LLE\par -symptoms resolved\par -LLE w/o signs of infection\par -may cont to use cetirizine for allergies PRN\par \par -HIV results reviewed w/ pt\par \par case d/w Dr. Cr

## 2020-06-27 NOTE — PHYSICAL EXAM
[No Acute Distress] : no acute distress [Well Nourished] : well nourished [Well Developed] : well developed [No Respiratory Distress] : no respiratory distress  [Well-Appearing] : well-appearing [Normal Rate] : normal rate  [Clear to Auscultation] : lungs were clear to auscultation bilaterally [Regular Rhythm] : with a regular rhythm [Normal S1, S2] : normal S1 and S2 [No Murmur] : no murmur heard [Soft] : abdomen soft [Non Tender] : non-tender [Non-distended] : non-distended [Normal Bowel Sounds] : normal bowel sounds [No Joint Swelling] : no joint swelling [No Rash] : no rash [No Skin Lesions] : no skin lesions [Coordination Grossly Intact] : coordination grossly intact [Normal Gait] : normal gait [de-identified] : LLE w/o erythema, not warm to touch, no TTP

## 2020-06-27 NOTE — HISTORY OF PRESENT ILLNESS
[FreeTextEntry1] : f/u bee sting [de-identified] : 43 y/o M presenting for f/u of bee sting to LLE. Pt was seen last week for bee sting causing pain, swelling, and pruritus. Pt feeling well today and all symptoms have resolved. Pt completed course of prednisone and pain controlled with NSAIDS. Denies fever, CP, SOB, LLE pain, or difficulty walking.

## 2020-08-08 ENCOUNTER — EMERGENCY (EMERGENCY)
Facility: HOSPITAL | Age: 45
LOS: 0 days | Discharge: HOME | End: 2020-08-08
Attending: EMERGENCY MEDICINE
Payer: SELF-PAY

## 2020-08-08 VITALS
OXYGEN SATURATION: 100 % | HEART RATE: 58 BPM | TEMPERATURE: 98 F | DIASTOLIC BLOOD PRESSURE: 68 MMHG | SYSTOLIC BLOOD PRESSURE: 130 MMHG | RESPIRATION RATE: 17 BRPM

## 2020-08-08 VITALS — HEIGHT: 63 IN | WEIGHT: 145.06 LBS

## 2020-08-08 DIAGNOSIS — E78.5 HYPERLIPIDEMIA, UNSPECIFIED: ICD-10-CM
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
## 2020-08-08 DIAGNOSIS — T23.231A BURN OF SECOND DEGREE OF MULTIPLE RIGHT FINGERS (NAIL), NOT INCLUDING THUMB, INITIAL ENCOUNTER: ICD-10-CM

## 2020-08-08 DIAGNOSIS — T31.0 BURNS INVOLVING LESS THAN 10% OF BODY SURFACE: ICD-10-CM
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
## 2020-08-08 PROCEDURE — 99283 EMERGENCY DEPT VISIT LOW MDM: CPT

## 2020-08-08 PROCEDURE — 99285 EMERGENCY DEPT VISIT HI MDM: CPT

## 2020-08-08 RX ORDER — BACITRACIN ZINC 500 UNIT/G
1 OINTMENT IN PACKET (EA) TOPICAL ONCE
Refills: 0 | Status: COMPLETED | OUTPATIENT
Start: 2020-08-08 | End: 2020-08-08

## 2020-08-08 RX ADMIN — Medication 1 APPLICATION(S): at 19:47

## 2020-08-08 NOTE — ED STATDOCS - NS ED ROS FT
Review of Systems:  	•	CONSTITUTIONAL: no fever  	•	SKIN: no rash, +burn to R 3rd and 4th digits with +blistering   	•	RESPIRATORY: no shortness of breath  	•	CARDIAC: no chest pain, no palpitations  	•	GI:  no abd pain, no nausea, no vomiting, no diarrhea  	•	GENITO-URINARY:  no dysuria; no hematuria    	•	MUSCULOSKELETAL:  no back pain, +R 3rd and 4th digit pain   	•	NEUROLOGIC: no weakness  	•	ALLERGY: no rhinitis  	•	PSYCHIATRIC: no anxiety Review of Systems:  	•	CONSTITUTIONAL: no fever  	•	SKIN: no rash, +burn to R 3rd and 4th digits with +blistering   	•	RESPIRATORY: no shortness of breath  	•	CARDIAC: no chest pain, no palpitations  	  	•	MUSCULOSKELETAL:  no back pain, +R 3rd and 4th digit pain     	•	PSYCHIATRIC: no anxiety
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
## 2020-08-08 NOTE — ED STATDOCS - PATIENT PORTAL LINK FT
You can access the FollowMyHealth Patient Portal offered by [facility] by registering at the following website: [email or web address]. By joining [facility]’s FollowMyHealth portal, you will also be able to view your health information using other applications (apps) compatible with our system.
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
## 2020-08-08 NOTE — ED STATDOCS - NSFOLLOWUPINSTRUCTIONS_ED_ALL_ED_FT
Superficial Burn    WHAT YOU NEED TO KNOW:    A superficial burn, or first-degree burn, is when the outer layer of skin has been burned.    DISCHARGE INSTRUCTIONS:    Call 911 for any of the following:     You have trouble breathing.         Return to the emergency department if:     You have a burn to the face, neck, hands, or genitals.       Your burn covers a large area such as your trunk or entire arm or leg.       You have increased redness, numbness, or swelling in the superficial burn area.      You have red streaks or blisters spreading outward from the superficial burn.      Your pain is not relieved, or is getting worse even after you take medicine.    Contact your healthcare provider if:     You have a fever.      You have questions or concerns about your condition or care.    Medicines:     Ibuprofen or acetaminophen are given to decrease your pain and swelling. They are available without a doctor's order. These medicines can cause liver or kidney problems if they are not used correctly. Ask how much medicine is safe to take, and how often to take it.      Take your medicine as directed. Contact your healthcare provider if you think your medicine is not helping or if you have side effects. Tell him of her if you are allergic to any medicine. Keep a list of the medicines, vitamins, and herbs you take. Include the amounts, and when and why you take them. Bring the list or the pill bottles to follow-up visits. Carry your medicine list with you in case of an emergency.    Follow up with your healthcare provider as directed: Write down your questions so you remember to ask them during your visits.    First aid for a superficial burn: A superficial burn usually heals in 3 to 5 days without scarring or blisters. Use the following first-aid guide to treat your burn:     Remove clothing and jewelry immediately.      Flush liquid chemicals from your skin completely with large amounts of cool running water. Do not splash the chemicals into your eyes. Run cool water over area of burn           Brush dry chemicals off your skin if large amounts of water are not available. Small amounts of water will activate some chemicals, such as lime, and cause more damage. Do not splash the chemicals into your eyes.      Run cool or cold temperature water over the burned area for 10 minutes. A cold or cool compress can also be put on the burn. Do not use ice or ice water on the affected area. This may cause more damage to the skin.      Use an antibacterial ointment or skin cream, such as aloe vera cream, to soothe the skin. Do not put butter, petroleum jelly, or other home remedies on skin burned by a chemical.      Do not put a bandage on the burn until you are told to do so by your healthcare provider.    Prevent superficial burns:     Do not leave cups, mugs, or bowls containing hot liquids at the edge of a table. Keep pot handles turned away from the stove front.       Do not leave a lit cigarette. Discard it properly. Keep cigarette lighters and matches in a safe place where children cannot reach them.      Keep your water heater setting to low or medium (90°F to 120°F, or 32°C to 48°C).      Wear sunscreen that has a sun protectant factor (SPF) of 15 or higher. The sunscreen should also have ultraviolet A (UVA) and ultraviolet B (UVB) protection. Follow the directions on the label when you use sunscreen. Put on more sunscreen if you are in the sun for more than an hour. Reapply sunscreen often if you go swimming or are sweating.

## 2020-08-08 NOTE — ED STATDOCS - NS ED MD DISPO DISCHARGE CCDA
Called patient to see if she could so Adrian appt on Tues 5/28 and she confirmed, she'll do labs/CXR on Fri 5/24 & brady &  appt on Tues 5/28, mailing letter   Patient/Caregiver provided printed discharge information.
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
## 2020-08-17 ENCOUNTER — APPOINTMENT (OUTPATIENT)
Dept: FAMILY MEDICINE | Facility: HOSPITAL | Age: 45
End: 2020-08-17

## 2020-08-17 ENCOUNTER — OUTPATIENT (OUTPATIENT)
Dept: OUTPATIENT SERVICES | Facility: HOSPITAL | Age: 45
LOS: 1 days | End: 2020-08-17
Payer: SELF-PAY

## 2020-08-17 VITALS
HEART RATE: 61 BPM | WEIGHT: 153 LBS | BODY MASS INDEX: 27.1 KG/M2 | OXYGEN SATURATION: 98 % | RESPIRATION RATE: 16 BRPM | SYSTOLIC BLOOD PRESSURE: 110 MMHG | DIASTOLIC BLOOD PRESSURE: 74 MMHG | TEMPERATURE: 97.1 F

## 2020-08-17 DIAGNOSIS — Z86.69 PERSONAL HISTORY OF OTHER DISEASES OF THE NERVOUS SYSTEM AND SENSE ORGANS: ICD-10-CM

## 2020-08-17 DIAGNOSIS — Z11.4 ENCOUNTER FOR SCREENING FOR HUMAN IMMUNODEFICIENCY VIRUS [HIV]: ICD-10-CM

## 2020-08-17 DIAGNOSIS — Z87.898 PERSONAL HISTORY OF OTHER SPECIFIED CONDITIONS: ICD-10-CM

## 2020-08-17 DIAGNOSIS — T63.441A TOXIC EFFECT OF VENOM OF BEES, ACCIDENTAL (UNINTENTIONAL), INITIAL ENCOUNTER: ICD-10-CM

## 2020-08-17 DIAGNOSIS — R10.11 RIGHT UPPER QUADRANT PAIN: ICD-10-CM

## 2020-08-17 DIAGNOSIS — R05 COUGH: ICD-10-CM

## 2020-08-17 DIAGNOSIS — R63.4 ABNORMAL WEIGHT LOSS: ICD-10-CM

## 2020-08-17 DIAGNOSIS — Z00.00 ENCOUNTER FOR GENERAL ADULT MEDICAL EXAMINATION WITHOUT ABNORMAL FINDINGS: ICD-10-CM

## 2020-08-17 DIAGNOSIS — Z23 ENCOUNTER FOR IMMUNIZATION: ICD-10-CM

## 2020-08-17 DIAGNOSIS — M25.50 PAIN IN UNSPECIFIED JOINT: ICD-10-CM

## 2020-08-17 DIAGNOSIS — M25.472 EFFUSION, LEFT ANKLE: ICD-10-CM

## 2020-08-17 RX ORDER — CETIRIZINE HYDROCHLORIDE 10 MG/1
10 TABLET, COATED ORAL DAILY
Qty: 14 | Refills: 0 | Status: DISCONTINUED | COMMUNITY
Start: 2020-06-18 | End: 2020-08-17

## 2020-08-18 DIAGNOSIS — T23.231A BURN OF SECOND DEGREE OF MULTIPLE RIGHT FINGERS (NAIL), NOT INCLUDING THUMB, INITIAL ENCOUNTER: ICD-10-CM

## 2020-08-18 DIAGNOSIS — Z29.9 ENCOUNTER FOR PROPHYLACTIC MEASURES, UNSPECIFIED: ICD-10-CM
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
## 2020-08-18 NOTE — REVIEW OF SYSTEMS
[Fever] : no fever [Chills] : no chills [Chest Pain] : no chest pain [Cough] : no cough [Shortness Of Breath] : no shortness of breath [de-identified] : +burn (see HPI)
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
## 2020-08-22 PROCEDURE — 85025 COMPLETE CBC W/AUTO DIFF WBC: CPT

## 2020-08-22 PROCEDURE — 80061 LIPID PANEL: CPT

## 2020-08-22 PROCEDURE — 83036 HEMOGLOBIN GLYCOSYLATED A1C: CPT

## 2020-08-22 PROCEDURE — 80048 BASIC METABOLIC PNL TOTAL CA: CPT

## 2020-08-22 PROCEDURE — G0463: CPT

## 2020-08-28 LAB
ANION GAP SERPL CALC-SCNC: 12 MMOL/L
BASOPHILS # BLD AUTO: 0.04 K/UL
BASOPHILS NFR BLD AUTO: 0.6 %
BUN SERPL-MCNC: 19 MG/DL
CALCIUM SERPL-MCNC: 9.7 MG/DL
CHLORIDE SERPL-SCNC: 104 MMOL/L
CHOLEST SERPL-MCNC: 238 MG/DL
CHOLEST/HDLC SERPL: 3.5 RATIO
CO2 SERPL-SCNC: 25 MMOL/L
CREAT SERPL-MCNC: 0.84 MG/DL
EOSINOPHIL # BLD AUTO: 0.08 K/UL
EOSINOPHIL NFR BLD AUTO: 1.2 %
ESTIMATED AVERAGE GLUCOSE: 108 MG/DL
GLUCOSE SERPL-MCNC: 95 MG/DL
HBA1C MFR BLD HPLC: 5.4 %
HCT VFR BLD CALC: 49.4 %
HDLC SERPL-MCNC: 69 MG/DL
HGB BLD-MCNC: 16.5 G/DL
IMM GRANULOCYTES NFR BLD AUTO: 0.1 %
LDLC SERPL CALC-MCNC: 137 MG/DL
LYMPHOCYTES # BLD AUTO: 2.92 K/UL
LYMPHOCYTES NFR BLD AUTO: 42.8 %
MAN DIFF?: NORMAL
MCHC RBC-ENTMCNC: 31.2 PG
MCHC RBC-ENTMCNC: 33.4 GM/DL
MCV RBC AUTO: 93.4 FL
MONOCYTES # BLD AUTO: 0.73 K/UL
MONOCYTES NFR BLD AUTO: 10.7 %
NEUTROPHILS # BLD AUTO: 3.04 K/UL
NEUTROPHILS NFR BLD AUTO: 44.6 %
PLATELET # BLD AUTO: 326 K/UL
POTASSIUM SERPL-SCNC: 4.4 MMOL/L
RBC # BLD: 5.29 M/UL
RBC # FLD: 12.3 %
SODIUM SERPL-SCNC: 141 MMOL/L
TRIGL SERPL-MCNC: 159 MG/DL
WBC # FLD AUTO: 6.82 K/UL

## 2020-09-22 ENCOUNTER — APPOINTMENT (OUTPATIENT)
Dept: FAMILY MEDICINE | Facility: HOSPITAL | Age: 45
End: 2020-09-22

## 2020-09-22 ENCOUNTER — OUTPATIENT (OUTPATIENT)
Dept: OUTPATIENT SERVICES | Facility: HOSPITAL | Age: 45
LOS: 1 days | End: 2020-09-22
Payer: SELF-PAY

## 2020-09-22 DIAGNOSIS — Z00.00 ENCOUNTER FOR GENERAL ADULT MEDICAL EXAMINATION WITHOUT ABNORMAL FINDINGS: ICD-10-CM

## 2020-09-22 DIAGNOSIS — T23.231A: ICD-10-CM

## 2020-09-22 PROCEDURE — G0463: CPT

## 2020-09-22 PROCEDURE — 87338 HPYLORI STOOL AG IA: CPT

## 2020-09-22 RX ORDER — SILVER SULFADIAZINE 10 MG/G
1 CREAM TOPICAL TWICE DAILY
Qty: 1 | Refills: 0 | Status: DISCONTINUED | COMMUNITY
Start: 2020-08-17 | End: 2020-09-22

## 2020-09-22 NOTE — HEALTH RISK ASSESSMENT
[21-25] : 21-25 [Yes] : Yes [2 - 3 times a week (3 pts)] : 2 - 3  times a week (3 points) [1 or 2 (0 pts)] : 1 or 2 (0 points) [Never (0 pts)] : Never (0 points) [No] : In the past 12 months have you used drugs other than those required for medical reasons? No [0] : 2) Feeling down, depressed, or hopeless: Not at all (0) [With Significant Other] : lives with significant other [# of Members in Household ___] :  household currently consist of [unfilled] member(s) [] :  [Sexually Active] : sexually active [Feels Safe at Home] : Feels safe at home [Smoke Detector] : smoke detector [Carbon Monoxide Detector] : carbon monoxide detector [Seat Belt] :  uses seat belt [With Patient/Caregiver] : With Patient/Caregiver [Designated Healthcare Proxy] : Designated healthcare proxy [Name: ___] : Health Care Proxy's Name: [unfilled]  [Relationship: ___] : Relationship: [unfilled] [] : No [YearQuit] : 2019 [Audit-CScore] : 3 [de-identified] : not much [de-identified] : varied diet, not much sugary drinks  [ORS7Mlmwt] : 0 [Reports changes in hearing] : Reports no changes in hearing [Reports changes in vision] : Reports no changes in vision [Reports changes in dental health] : Reports no changes in dental health [Guns at Home] : no guns at home [Sunscreen] : does not use sunscreen [Travel to Developing Areas] : does not  travel to developing areas [FreeTextEntry2] : construction [AdvancecareDate] : 09/20

## 2020-09-22 NOTE — HISTORY OF PRESENT ILLNESS
[Patient Declined  Services] : - None: Patient declined  services [FreeTextEntry3] : Pt preferred shared language.  [de-identified] : 44M w/ PMHx of HLD presenting for CPE. Pt reports feeling well, but reports heartburn x 3 months. Worse at night and with specific foods. Pt reports wine 2 cups a week. Never had heartburn in th past. Pt has been cutting out certain food triggers with some relief. No fevers, chills, weight changes. Pt also reports polyuria (6x/day) for years not associated w/ hesitancy or abnormal stream. Only drinks about 1 bottle of water a day. Pt reports after using the restroom, he feels like he still needs to go. Denies fever, flank pain, dysuria or discharge. Denies family history of prostate issues.

## 2020-09-22 NOTE — REVIEW OF SYSTEMS
[Fever] : no fever [Chills] : no chills [Discharge] : no discharge [Pain] : no pain [Vision Problems] : no vision problems [Earache] : no earache [Nasal Discharge] : no nasal discharge [Sore Throat] : no sore throat [Chest Pain] : no chest pain [Palpitations] : no palpitations [Shortness Of Breath] : no shortness of breath [Cough] : no cough [Abdominal Pain] : no abdominal pain [Nausea] : no nausea [Constipation] : no constipation [Diarrhea] : no diarrhea [Vomiting] : no vomiting [Dysuria] : no dysuria [Incontinence] : no incontinence [Hesitancy] : no hesitancy [Nocturia] : no nocturia [Hematuria] : no hematuria [Frequency] : frequency [Joint Pain] : no joint pain [Back Pain] : no back pain [Itching] : no itching [Skin Rash] : no skin rash [Headache] : no headache [Dizziness] : no dizziness [Suicidal] : not suicidal [Anxiety] : no anxiety [Depression] : no depression [Easy Bleeding] : no easy bleeding [Easy Bruising] : no easy bruising

## 2020-09-22 NOTE — PLAN
[FreeTextEntry1] : #GERD\par - h pylori test ordered\par - discussed lifestyle modifications including smaller volumes, staying upright, avoiding food triggers\par - pantoprazole sent AFTER pt submits h pylori\par - f/u in 1 month \par \par #Polyuria\par - likely overactive bladder\par - unlikely prostate issue or infectious issue\par - trial of oxybutynin - discussed anticholinergic side effects and uptitrating dose w/ max of 5mg QID\par - f/u in 1 month\par \par #HCM \par - flu vaccine deferred by pt \par - other screenings up to date\par \par RTC in 1 month for f/u GERD & overactive bladder\par d/w Dr. Cr

## 2020-09-22 NOTE — PHYSICAL EXAM
[No Lymphadenopathy] : no lymphadenopathy [Supple] : supple [Thyroid Normal, No Nodules] : the thyroid was normal and there were no nodules present [Pedal Pulses Present] : the pedal pulses are present [No Edema] : there was no peripheral edema [Normal] : affect was normal and insight and judgment were intact

## 2020-09-24 DIAGNOSIS — K21.9 GASTRO-ESOPHAGEAL REFLUX DISEASE WITHOUT ESOPHAGITIS: ICD-10-CM

## 2020-09-24 DIAGNOSIS — N32.81 OVERACTIVE BLADDER: ICD-10-CM

## 2020-09-28 LAB — H PYLORI AG STL QL: DETECTED

## 2020-09-28 RX ORDER — PANTOPRAZOLE 20 MG/1
20 TABLET, DELAYED RELEASE ORAL DAILY
Qty: 30 | Refills: 0 | Status: DISCONTINUED | COMMUNITY
Start: 2020-09-22 | End: 2020-09-28

## 2020-11-04 ENCOUNTER — NON-APPOINTMENT (OUTPATIENT)
Age: 45
End: 2020-11-04

## 2020-11-11 ENCOUNTER — OUTPATIENT (OUTPATIENT)
Dept: OUTPATIENT SERVICES | Facility: HOSPITAL | Age: 45
LOS: 1 days | End: 2020-11-11
Payer: SELF-PAY

## 2020-11-11 ENCOUNTER — APPOINTMENT (OUTPATIENT)
Dept: FAMILY MEDICINE | Facility: HOSPITAL | Age: 45
End: 2020-11-11

## 2020-11-11 VITALS
RESPIRATION RATE: 14 BRPM | TEMPERATURE: 97.8 F | WEIGHT: 150 LBS | HEART RATE: 65 BPM | BODY MASS INDEX: 26.57 KG/M2 | DIASTOLIC BLOOD PRESSURE: 62 MMHG | OXYGEN SATURATION: 99 % | SYSTOLIC BLOOD PRESSURE: 121 MMHG

## 2020-11-11 DIAGNOSIS — E78.5 HYPERLIPIDEMIA, UNSPECIFIED: ICD-10-CM

## 2020-11-11 DIAGNOSIS — Z00.00 ENCOUNTER FOR GENERAL ADULT MEDICAL EXAMINATION WITHOUT ABNORMAL FINDINGS: ICD-10-CM

## 2020-11-11 DIAGNOSIS — N32.81 OVERACTIVE BLADDER: ICD-10-CM

## 2020-11-11 NOTE — PLAN
[FreeTextEntry1] : #H. Pylori\par - Stool Sample ordered, patient to follow up\par - Medication course completed\par - Asymptomatic\par \par #Urinary Fullness\par - Prostate Exam\par - F/U PSA\par \par #Fatigue\par - TSH ordered\par - Vitamin D ordered \par \par #RTC in 1 month

## 2020-11-11 NOTE — REVIEW OF SYSTEMS
[Dysuria] : dysuria [Incontinence] : incontinence [Hesitancy] : hesitancy [Nocturia] : nocturia [Hematuria] : hematuria [Frequency] : frequency [Impotence] : impotence [Joint Pain] : joint pain [Negative] : Neurological [FreeTextEntry8] : Dribbling of urine

## 2020-11-11 NOTE — ASSESSMENT
[FreeTextEntry1] : 46 YO M presents for Follow up of H. Pylori s/p 6 weeks of triple therapy. Patient is NAD, well appearing. reports continuing urinary fullness and problems urinating\par \par #H. Pylori\par - Stool Sample ordered, patient to follow up\par - Medication course completed\par - Asymptomatic\par \par #Urinary Fullness\par - Prostate Exam\par - F/U PSA\par \par #Fatigue\par - TSH ordered\par - Vitamin D ordered \par \par #RTC in 1 month

## 2020-11-11 NOTE — HISTORY OF PRESENT ILLNESS
[FreeTextEntry1] : Patient presents for H. Pylori follow up. Patient finished his triple therapy treatment and will need a stool sample to confirm resolution.  [de-identified] : Patient presented with symptoms of GERD in September 2020, was tested for H.Pylori. which was postive. Patient completed triple therapy treatment. Patient also complains of fatigue, and problems with urinary frequency, nocturia and urinary dribbling. He was prescribed oxybutynin last month but has not taken it.

## 2020-11-11 NOTE — PHYSICAL EXAM
[Normal] : normal rate, regular rhythm, normal S1 and S2 and no murmur heard [Soft] : abdomen soft [Non Tender] : non-tender [Non-distended] : non-distended [No HSM] : no HSM [Normal Bowel Sounds] : normal bowel sounds [Rectal Exam - Rectum] : digital rectal exam was normal [Prostate Enlargement] : the prostate was not enlarged [Prostate Tenderness] : the prostate was not tender [No Prostate Nodules] : no prostate nodules [No CVA Tenderness] : no CVA  tenderness [No Spinal Tenderness] : no spinal tenderness [No Joint Swelling] : no joint swelling [No Rash] : no rash

## 2020-11-12 LAB
PSA FREE SERPL-MCNC: 0.14 NG/ML
TSH SERPL-ACNC: 0.66 UIU/ML

## 2020-11-13 DIAGNOSIS — E78.5 HYPERLIPIDEMIA, UNSPECIFIED: ICD-10-CM

## 2020-11-13 DIAGNOSIS — K21.9 GASTRO-ESOPHAGEAL REFLUX DISEASE WITHOUT ESOPHAGITIS: ICD-10-CM

## 2020-11-13 DIAGNOSIS — N32.81 OVERACTIVE BLADDER: ICD-10-CM

## 2020-11-17 LAB — 24R-OH-CALCIDIOL SERPL-MCNC: 60.8 PG/ML

## 2020-11-20 PROCEDURE — 82652 VIT D 1 25-DIHYDROXY: CPT

## 2020-11-20 PROCEDURE — G0463: CPT

## 2020-11-20 PROCEDURE — 87338 HPYLORI STOOL AG IA: CPT

## 2020-11-20 PROCEDURE — 84443 ASSAY THYROID STIM HORMONE: CPT

## 2020-11-25 LAB — H PYLORI AG STL QL: NOT DETECTED

## 2020-12-28 ENCOUNTER — OUTPATIENT (OUTPATIENT)
Dept: OUTPATIENT SERVICES | Facility: HOSPITAL | Age: 45
LOS: 1 days | End: 2020-12-28
Payer: SELF-PAY

## 2020-12-28 ENCOUNTER — APPOINTMENT (OUTPATIENT)
Dept: FAMILY MEDICINE | Facility: HOSPITAL | Age: 45
End: 2020-12-28

## 2020-12-28 VITALS
TEMPERATURE: 97 F | RESPIRATION RATE: 14 BRPM | SYSTOLIC BLOOD PRESSURE: 124 MMHG | HEART RATE: 63 BPM | OXYGEN SATURATION: 97 % | BODY MASS INDEX: 25.69 KG/M2 | DIASTOLIC BLOOD PRESSURE: 76 MMHG | WEIGHT: 145 LBS

## 2020-12-28 DIAGNOSIS — Z00.00 ENCOUNTER FOR GENERAL ADULT MEDICAL EXAMINATION WITHOUT ABNORMAL FINDINGS: ICD-10-CM

## 2020-12-28 DIAGNOSIS — K21.9 GASTRO-ESOPHAGEAL REFLUX DISEASE W/OUT ESOPHAGITIS: ICD-10-CM

## 2020-12-28 PROCEDURE — G0463: CPT

## 2020-12-28 RX ORDER — PANTOPRAZOLE 40 MG/1
40 TABLET, DELAYED RELEASE ORAL DAILY
Qty: 30 | Refills: 0 | Status: COMPLETED | COMMUNITY
Start: 2020-09-28

## 2020-12-28 NOTE — PHYSICAL EXAM
[Normal] : normal rate, regular rhythm, normal S1 and S2 and no murmur heard [Soft] : abdomen soft [Non Tender] : non-tender [Non-distended] : non-distended [No HSM] : no HSM [Normal Bowel Sounds] : normal bowel sounds [No CVA Tenderness] : no CVA  tenderness [No Spinal Tenderness] : no spinal tenderness [No Joint Swelling] : no joint swelling [No Rash] : no rash

## 2020-12-29 ENCOUNTER — NON-APPOINTMENT (OUTPATIENT)
Age: 45
End: 2020-12-29

## 2020-12-29 DIAGNOSIS — K21.9 GASTRO-ESOPHAGEAL REFLUX DISEASE WITHOUT ESOPHAGITIS: ICD-10-CM

## 2020-12-29 DIAGNOSIS — A04.8 OTHER SPECIFIED BACTERIAL INTESTINAL INFECTIONS: ICD-10-CM

## 2020-12-29 DIAGNOSIS — Z20.828 CONTACT WITH AND (SUSPECTED) EXPOSURE TO OTHER VIRAL COMMUNICABLE DISEASES: ICD-10-CM

## 2020-12-29 NOTE — PLAN
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 Pylori Infection \par - Resolved\par - Continue Pantoprazole for heartburn as needed\par \par #Urinary Fullness\par - Resolved, Symptoms improved\par \par #Erectile Dysfunction\par - Psychological likely \par - Still has nighttime erections\par - Did not recommend any herbal supplements or vitamins

## 2020-12-29 NOTE — HISTORY OF PRESENT ILLNESS
[FreeTextEntry1] : Patient presents for Follow up of Urinary fullness and H. Pylori infection\par  [de-identified] : 45 Y M presents for follow up of H. Pylori infection. States that his heartburn is minimal, and that his epigastric pain has resolved completely. H. Pylori stool test came back negative. States that his urinary fullness has improved, but states that he has trouble achiving erections as he once did. States that he still has nocturnal erections, but wants to know if there are any herbal supplementations or vitamins that can help him with this.

## 2020-12-29 NOTE — REVIEW OF SYSTEMS
[Poor Libido] : poor libido [Negative] : Neurological [Dysuria] : no dysuria [Incontinence] : no incontinence [Hesitancy] : no hesitancy [Nocturia] : no nocturia [Hematuria] : no hematuria [Frequency] : no frequency [Impotence] : no impotency [Joint Pain] : no joint pain

## 2020-12-31 ENCOUNTER — OUTPATIENT (OUTPATIENT)
Dept: OUTPATIENT SERVICES | Facility: HOSPITAL | Age: 45
LOS: 1 days | End: 2020-12-31
Payer: SELF-PAY

## 2020-12-31 ENCOUNTER — APPOINTMENT (OUTPATIENT)
Dept: FAMILY MEDICINE | Facility: HOSPITAL | Age: 45
End: 2020-12-31

## 2020-12-31 DIAGNOSIS — Z00.00 ENCOUNTER FOR GENERAL ADULT MEDICAL EXAMINATION WITHOUT ABNORMAL FINDINGS: ICD-10-CM

## 2020-12-31 DIAGNOSIS — Z11.52 ENCOUNTER FOR SCREENING FOR COVID-19: ICD-10-CM

## 2020-12-31 PROCEDURE — G0463: CPT

## 2020-12-31 PROCEDURE — 87635 SARS-COV-2 COVID-19 AMP PRB: CPT

## 2020-12-31 NOTE — HISTORY OF PRESENT ILLNESS
[Home] : at home, [unfilled] , at the time of the visit. [Medical Office: ([facility])___] : at the medical office located in  [Verbal consent obtained from patient] : the patient, [unfilled] [Time Spent: ___ minutes] : I have spent [unfilled] minutes with the patient on the telephone [FreeTextEntry1] : 45 year old male who presents to the clinic for COVID 19 screening. Pt is currently asymptomatic. Denies cough, SOB, fever, loss of taste, diarrhea, nausea, vomiting. \par \par Plan\par - COVID PCR testing\par - will f/u with results \par - advised to wash hands, social distance, wear mask

## 2021-01-04 ENCOUNTER — APPOINTMENT (OUTPATIENT)
Dept: FAMILY MEDICINE | Facility: HOSPITAL | Age: 46
End: 2021-01-04

## 2021-01-04 ENCOUNTER — NON-APPOINTMENT (OUTPATIENT)
Age: 46
End: 2021-01-04

## 2021-01-04 DIAGNOSIS — Z20.828 CONTACT WITH AND (SUSPECTED) EXPOSURE TO OTHER VIRAL COMMUNICABLE DISEASES: ICD-10-CM

## 2021-01-04 LAB — SARS-COV-2 N GENE NPH QL NAA+PROBE: DETECTED

## 2021-01-11 LAB — HIV1+2 AB SPEC QL IA.RAPID: NONREACTIVE

## 2021-01-13 NOTE — PLAN
[FreeTextEntry1] : #COVID19\par -Continue self quarantine for 10 days after positive test, and >24 w/o fevers, as per CDC guidelines\par -Continue social distancing, mask wearing, frequent hand washing\par -Currently asymptomatic but instructed to use tylenol for fevers, mucinex PRN cough/congestion\par -Go to ED if having worsening symptoms inc. SOB/Christina, CP

## 2021-01-13 NOTE — HISTORY OF PRESENT ILLNESS
[Home] : at home, [unfilled] , at the time of the visit. [Medical Office: ([facility])___] : at the medical office located in  [Verbal consent obtained from patient] : the patient, [unfilled] [Time Spent: ___ minutes] : I have spent [unfilled] minutes with the patient on the telephone [FreeTextEntry1] : 44 y/o M COVID19 testing resulted POSITIVE. Pt notified and aware. Pt is currently asymptomatic. Denies cough, SOB, fever anosmia, ageusia, n/v/d, abd pain\par \par

## 2021-02-26 ENCOUNTER — OUTPATIENT (OUTPATIENT)
Dept: OUTPATIENT SERVICES | Facility: HOSPITAL | Age: 46
LOS: 1 days | End: 2021-02-26
Payer: SELF-PAY

## 2021-02-26 ENCOUNTER — NON-APPOINTMENT (OUTPATIENT)
Age: 46
End: 2021-02-26

## 2021-02-26 ENCOUNTER — APPOINTMENT (OUTPATIENT)
Dept: FAMILY MEDICINE | Facility: HOSPITAL | Age: 46
End: 2021-02-26

## 2021-02-26 DIAGNOSIS — Z20.828 CONTACT WITH AND (SUSPECTED) EXPOSURE TO OTHER VIRAL COMMUNICABLE DISEASES: ICD-10-CM

## 2021-02-26 PROCEDURE — G0463: CPT

## 2021-02-26 NOTE — HISTORY OF PRESENT ILLNESS
[Other Location: e.g. School (Enter Location, City,State)___] : at [unfilled], at the time of the visit. [Medical Office: ([facility])___] : at the medical office located in  [Verbal consent obtained from patient] : the patient, [unfilled] [Time Spent: ___ minutes] : I have spent [unfilled] minutes with the patient on the telephone [FreeTextEntry1] : 45m coming in for a covid swab. Pt states he was recently exposed to someone with covid on Monday. Denies fevers, cough, diarrhea, nausea, SOB, loss of smell/taste, body aches. Pt asymptomatic with no complaints.

## 2021-02-26 NOTE — PLAN
[FreeTextEntry1] : COVID 19 exposure\par -instructed to continue to practice social distancing, wear mask, wash hands, quarantine until the results come back\par -go to the ED for SOB or worsening symptoms\par  above discussed w/ Dr. Cr

## 2021-03-02 LAB — SARS-COV-2 N GENE NPH QL NAA+PROBE: NOT DETECTED

## 2021-05-24 ENCOUNTER — APPOINTMENT (OUTPATIENT)
Dept: FAMILY MEDICINE | Facility: HOSPITAL | Age: 46
End: 2021-05-24

## 2021-05-24 ENCOUNTER — OUTPATIENT (OUTPATIENT)
Dept: OUTPATIENT SERVICES | Facility: HOSPITAL | Age: 46
LOS: 1 days | End: 2021-05-24
Payer: SELF-PAY

## 2021-05-24 VITALS
HEART RATE: 84 BPM | TEMPERATURE: 96.8 F | BODY MASS INDEX: 25.34 KG/M2 | SYSTOLIC BLOOD PRESSURE: 120 MMHG | OXYGEN SATURATION: 95 % | RESPIRATION RATE: 14 BRPM | HEIGHT: 63 IN | DIASTOLIC BLOOD PRESSURE: 78 MMHG | WEIGHT: 143 LBS

## 2021-05-24 VITALS — HEART RATE: 64 BPM | OXYGEN SATURATION: 99 %

## 2021-05-24 DIAGNOSIS — Z00.00 ENCOUNTER FOR GENERAL ADULT MEDICAL EXAMINATION WITHOUT ABNORMAL FINDINGS: ICD-10-CM

## 2021-05-24 DIAGNOSIS — Z86.16 PERSONAL HISTORY OF COVID-19: ICD-10-CM

## 2021-05-24 PROCEDURE — G0463: CPT

## 2021-05-24 RX ORDER — AMOXICILLIN 500 MG/1
500 CAPSULE ORAL TWICE DAILY
Qty: 56 | Refills: 0 | Status: DISCONTINUED | COMMUNITY
Start: 2020-09-28 | End: 2021-05-24

## 2021-05-24 RX ORDER — TAMSULOSIN HYDROCHLORIDE 0.4 MG/1
0.4 CAPSULE ORAL
Qty: 30 | Refills: 0 | Status: DISCONTINUED | COMMUNITY
Start: 2020-11-25 | End: 2021-05-24

## 2021-05-24 RX ORDER — OXYBUTYNIN CHLORIDE 5 MG/1
5 TABLET ORAL
Qty: 60 | Refills: 1 | Status: DISCONTINUED | COMMUNITY
Start: 2020-09-22 | End: 2021-05-24

## 2021-05-24 RX ORDER — CLARITHROMYCIN 500 MG/1
500 TABLET, FILM COATED ORAL TWICE DAILY
Qty: 28 | Refills: 0 | Status: DISCONTINUED | COMMUNITY
Start: 2020-09-28 | End: 2021-05-24

## 2021-05-25 DIAGNOSIS — J02.9 ACUTE PHARYNGITIS, UNSPECIFIED: ICD-10-CM

## 2021-05-25 DIAGNOSIS — J30.2 OTHER SEASONAL ALLERGIC RHINITIS: ICD-10-CM

## 2021-05-25 NOTE — REVIEW OF SYSTEMS
[Night Sweats] : night sweats [Redness] : redness [Itching] : itching [Sore Throat] : sore throat [Headache] : headache [Fever] : no fever [Chills] : no chills [Chest Pain] : no chest pain [Shortness Of Breath] : no shortness of breath [Abdominal Pain] : no abdominal pain [Nausea] : no nausea [Vomiting] : no vomiting [Dizziness] : no dizziness

## 2021-05-25 NOTE — HISTORY OF PRESENT ILLNESS
[FreeTextEntry8] : 44 yo M with hx of HLD, H.pylori infection, GERD and recent COVID19 infection (2021) presents with c/o subjective fever x 1 day, joint pain and cough x 4 days. Pt reports he started having these symptoms 5 days after receiving second dose of COVID19 vaccine (second dose covid19 vaccine given on 5/15/21). Pt c/o headache and sore throat.\par Denies CP and SOB. Pt also c/o  drip and itchy eyes. \par \par Pt denies recent travel.

## 2021-05-25 NOTE — PHYSICAL EXAM
[No Acute Distress] : no acute distress [Well Developed] : well developed [No Respiratory Distress] : no respiratory distress  [No Accessory Muscle Use] : no accessory muscle use [Clear to Auscultation] : lungs were clear to auscultation bilaterally [Normal Rate] : normal rate  [Regular Rhythm] : with a regular rhythm [Normal S1, S2] : normal S1 and S2 [No Edema] : there was no peripheral edema [Normal Outer Ear/Nose] : the outer ears and nose were normal in appearance [Normal Oropharynx] : the oropharynx was normal [Normal TMs] : both tympanic membranes were normal

## 2021-05-25 NOTE — ASSESSMENT
[FreeTextEntry1] : 46 yo M with hx of HLD, H.pylori infection, GERD and recent COVID19 infection (1/2021) presents with c/o generalized myalgia, subjective fever and cough after second dose of covid19 vaccine. Suspect symptoms 2/2 immune system response and component of seasonal allergies.

## 2021-06-26 ENCOUNTER — NON-APPOINTMENT (OUTPATIENT)
Age: 46
End: 2021-06-26

## 2021-06-26 ENCOUNTER — OUTPATIENT (OUTPATIENT)
Dept: OUTPATIENT SERVICES | Facility: HOSPITAL | Age: 46
LOS: 1 days | End: 2021-06-26
Payer: SELF-PAY

## 2021-06-26 ENCOUNTER — APPOINTMENT (OUTPATIENT)
Dept: FAMILY MEDICINE | Facility: HOSPITAL | Age: 46
End: 2021-06-26

## 2021-06-26 VITALS
RESPIRATION RATE: 17 BRPM | HEART RATE: 69 BPM | DIASTOLIC BLOOD PRESSURE: 70 MMHG | TEMPERATURE: 97.8 F | OXYGEN SATURATION: 96 % | SYSTOLIC BLOOD PRESSURE: 123 MMHG | WEIGHT: 146 LBS | BODY MASS INDEX: 25.86 KG/M2

## 2021-06-26 DIAGNOSIS — Z87.09 PERSONAL HISTORY OF OTHER DISEASES OF THE RESPIRATORY SYSTEM: ICD-10-CM

## 2021-06-26 DIAGNOSIS — Z00.00 ENCOUNTER FOR GENERAL ADULT MEDICAL EXAMINATION WITHOUT ABNORMAL FINDINGS: ICD-10-CM

## 2021-06-26 DIAGNOSIS — J30.2 OTHER SEASONAL ALLERGIC RHINITIS: ICD-10-CM

## 2021-06-26 DIAGNOSIS — J35.8 OTHER CHRONIC DISEASES OF TONSILS AND ADENOIDS: ICD-10-CM

## 2021-06-26 PROCEDURE — U0003: CPT

## 2021-06-26 PROCEDURE — G0463: CPT

## 2021-06-26 PROCEDURE — U0005: CPT

## 2021-06-26 RX ORDER — MENTHOL/CETYLPYRD CL 3 MG
3 LOZENGE MUCOUS MEMBRANE
Qty: 5 | Refills: 0 | Status: DISCONTINUED | COMMUNITY
Start: 2021-05-24 | End: 2021-06-26

## 2021-06-27 PROBLEM — J30.2 SEASONAL ALLERGIES: Status: ACTIVE | Noted: 2021-05-24

## 2021-06-27 PROBLEM — J35.8 TONSILLOLITH: Status: ACTIVE | Noted: 2021-06-27

## 2021-06-27 LAB — SARS-COV-2 N GENE NPH QL NAA+PROBE: NOT DETECTED

## 2021-06-28 NOTE — PHYSICAL EXAM
[No Acute Distress] : no acute distress [Well Developed] : well developed [Normal Sclera/Conjunctiva] : normal sclera/conjunctiva [EOMI] : extraocular movements intact [Normal Oropharynx] : the oropharynx was normal [No Respiratory Distress] : no respiratory distress  [No Accessory Muscle Use] : no accessory muscle use [Normal Rate] : normal rate  [Clear to Auscultation] : lungs were clear to auscultation bilaterally [Regular Rhythm] : with a regular rhythm [Normal S1, S2] : normal S1 and S2 [de-identified] : no presence of cobblestoning , no erythema or exudates of oropharynx/tonsils

## 2021-06-28 NOTE — REVIEW OF SYSTEMS
[Itching] : itching [Postnasal Drip] : postnasal drip [Fever] : no fever [Chills] : no chills [Earache] : no earache [Nasal Discharge] : no nasal discharge [Sore Throat] : no sore throat [Chest Pain] : no chest pain [Palpitations] : no palpitations [Shortness Of Breath] : no shortness of breath [Wheezing] : no wheezing [Abdominal Pain] : no abdominal pain [Nausea] : no nausea [Vomiting] : no vomiting [Headache] : no headache [Dizziness] : no dizziness [FreeTextEntry6] : as per HPI

## 2021-06-28 NOTE — HISTORY OF PRESENT ILLNESS
[FreeTextEntry1] : f/u seasonal allergies  [de-identified] : 46 yo M with hx of HLD, H.pylori infection, GERD and recent COVID19 infection (1/2021) presents for follow up allergies.\par Patient has completed his COVID19 vaccine and states he occasionally has a dry cough, denies rhinorrhea, fevers, chills, CP and SOB. Pt reports taking cepacol for dry cough and no longer using flonase. \par He states he sometimes feels tickling/ dripping sensation in the back of his throat. He also states he often feels something in the back of his throat and coughs it up (describes as a white round substance with foul odor).\par He also c/o eye pain 2/2 itchy eyes. \par \par Patient requesting COVID19 testing today for precaution. He states he works as a . Denies known sick contacts.\par \par \par VItal signs: \par /70, T-97.8 F, HR-69, RR-17, O2 sat- 96, Weight- 146 lb

## 2021-06-28 NOTE — PLAN
[FreeTextEntry1] : \par \par \par \par \par #COVID19 precautions\par -Pt requested COVID19 testing today (completed)\par \par \par D/w Dr. Ramirez\par F/u for CPE in 9/2020

## 2021-06-29 DIAGNOSIS — R05 COUGH: ICD-10-CM

## 2021-06-29 DIAGNOSIS — J30.2 OTHER SEASONAL ALLERGIC RHINITIS: ICD-10-CM

## 2021-08-14 ENCOUNTER — EMERGENCY (EMERGENCY)
Facility: HOSPITAL | Age: 46
LOS: 1 days | Discharge: HOME | End: 2021-08-14
Attending: EMERGENCY MEDICINE | Admitting: EMERGENCY MEDICINE
Payer: MEDICARE

## 2021-08-14 VITALS
DIASTOLIC BLOOD PRESSURE: 81 MMHG | RESPIRATION RATE: 16 BRPM | OXYGEN SATURATION: 99 % | TEMPERATURE: 98 F | SYSTOLIC BLOOD PRESSURE: 132 MMHG | HEIGHT: 63 IN | HEART RATE: 60 BPM | WEIGHT: 145.06 LBS

## 2021-08-14 PROCEDURE — U0003: CPT

## 2021-08-14 PROCEDURE — 99283 EMERGENCY DEPT VISIT LOW MDM: CPT | Mod: 25

## 2021-08-14 PROCEDURE — 99284 EMERGENCY DEPT VISIT MOD MDM: CPT

## 2021-08-14 PROCEDURE — 96372 THER/PROPH/DIAG INJ SC/IM: CPT

## 2021-08-14 PROCEDURE — U0005: CPT

## 2021-08-14 RX ORDER — KETOROLAC TROMETHAMINE 30 MG/ML
30 SYRINGE (ML) INJECTION ONCE
Refills: 0 | Status: DISCONTINUED | OUTPATIENT
Start: 2021-08-14 | End: 2021-08-14

## 2021-08-14 RX ORDER — METHOCARBAMOL 500 MG/1
750 TABLET, FILM COATED ORAL ONCE
Refills: 0 | Status: COMPLETED | OUTPATIENT
Start: 2021-08-14 | End: 2021-08-14

## 2021-08-14 RX ORDER — METHOCARBAMOL 500 MG/1
1 TABLET, FILM COATED ORAL
Qty: 15 | Refills: 0
Start: 2021-08-14 | End: 2021-08-18

## 2021-08-14 RX ORDER — IBUPROFEN 200 MG
1 TABLET ORAL
Qty: 20 | Refills: 0
Start: 2021-08-14 | End: 2021-08-18

## 2021-08-14 RX ORDER — LIDOCAINE 4 G/100G
1 CREAM TOPICAL
Qty: 30 | Refills: 0
Start: 2021-08-14 | End: 2021-09-12

## 2021-08-14 RX ORDER — LIDOCAINE 4 G/100G
1 CREAM TOPICAL ONCE
Refills: 0 | Status: COMPLETED | OUTPATIENT
Start: 2021-08-14 | End: 2021-08-14

## 2021-08-14 RX ADMIN — Medication 30 MILLIGRAM(S): at 13:29

## 2021-08-14 RX ADMIN — LIDOCAINE 1 PATCH: 4 CREAM TOPICAL at 12:59

## 2021-08-14 RX ADMIN — METHOCARBAMOL 750 MILLIGRAM(S): 500 TABLET, FILM COATED ORAL at 13:00

## 2021-08-14 RX ADMIN — Medication 30 MILLIGRAM(S): at 12:59

## 2021-08-14 NOTE — ED ADULT NURSE NOTE - NSIMPLEMENTINTERV_GEN_ALL_ED
Implemented All Universal Safety Interventions:  [place] to call system. Call bell, personal items and telephone within reach. Instruct patient to call for assistance. Room bathroom lighting operational. Non-slip footwear when patient is off stretcher. Physically safe environment: no spills, clutter or unnecessary equipment. Stretcher in lowest position, wheels locked, appropriate side rails in place.

## 2021-08-14 NOTE — ED PROVIDER NOTE - CLINICAL SUMMARY MEDICAL DECISION MAKING FREE TEXT BOX
45M presents to the ED c/o low back pain. Patient states he has been sleeping on a couch for a week and today, he could not get up. He noticed  that he was having increasing back pain but it worsened today. 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 No urinary or bowel incontinence. No lower extremity weakness or numbness. He said that after taking a shower, it did help a bit. However, it is still very painful.   Exam as stated. Likely Muscular in origin. No bony tenderness. Recc NSAID, Muscle relaxant, and Lido patch. Reassess. Will offer heating packs.    Pt also requesting covid swab as there was someone at work who tested +.       Pain improved. Stable for d/c to home. Will rx meds prn for home use.     Worsening, continued or ANY new concerning symptoms return to the emergency department.

## 2021-08-14 NOTE — ED PROVIDER NOTE - PATIENT PORTAL LINK FT
You can access the FollowMyHealth Patient Portal offered by [facility] by registering at the following website: [email or web address]. By joining [employer]’s FollowMyHealth portal, you will also be able to view your health information using other applications (apps) compatible with our system.

## 2021-08-14 NOTE — ED ADULT NURSE NOTE - CHPI ED NUR SYMPTOMS NEG
no bladder dysfunction/no bowel dysfunction/no constipation/no difficulty bearing weight/no motor function loss

## 2021-08-14 NOTE — ED ADULT NURSE NOTE - OBJECTIVE STATEMENT
Patient with lower medial back pain x approximately one week. Patient states he has been sleeping on a sofa x 1 week until yesterday and he suspects this is the cause of his pain. Warm packs applied, no bowel/bladder incontinence. Pain worsened with twisting movements.

## 2021-08-14 NOTE — ED PROVIDER NOTE - CALF TENDERNESS
left and right paralumbar tenderness on exam. Limited ROM - unable to laterally rotate, flex or extend.

## 2021-08-14 NOTE — ED PROVIDER NOTE - OBJECTIVE STATEMENT
45M presents to the ED c/o low back pain. Patient states he has been sleeping on a couch for a week and today, he could not get up. He noticed  that he was having increasing back pain but it worsened today. 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 No urinary or bowel incontinence. No lower extremity weakness or numbness. He said that after taking a shower, it did help a bit. However, it is still very painful.

## 2021-08-15 LAB — SARS-COV-2 RNA SPEC QL NAA+PROBE: SIGNIFICANT CHANGE UP

## 2021-08-19 ENCOUNTER — OUTPATIENT (OUTPATIENT)
Dept: OUTPATIENT SERVICES | Facility: HOSPITAL | Age: 46
LOS: 1 days | End: 2021-08-19
Payer: SELF-PAY

## 2021-08-19 ENCOUNTER — APPOINTMENT (OUTPATIENT)
Dept: FAMILY MEDICINE | Facility: HOSPITAL | Age: 46
End: 2021-08-19

## 2021-08-19 VITALS
RESPIRATION RATE: 14 BRPM | OXYGEN SATURATION: 98 % | TEMPERATURE: 96.6 F | HEART RATE: 53 BPM | WEIGHT: 149 LBS | DIASTOLIC BLOOD PRESSURE: 80 MMHG | BODY MASS INDEX: 26.39 KG/M2 | SYSTOLIC BLOOD PRESSURE: 123 MMHG

## 2021-08-19 DIAGNOSIS — Z86.19 PERSONAL HISTORY OF OTHER INFECTIOUS AND PARASITIC DISEASES: ICD-10-CM

## 2021-08-19 DIAGNOSIS — Z00.00 ENCOUNTER FOR GENERAL ADULT MEDICAL EXAMINATION WITHOUT ABNORMAL FINDINGS: ICD-10-CM

## 2021-08-19 DIAGNOSIS — Z29.9 ENCOUNTER FOR PROPHYLACTIC MEASURES, UNSPECIFIED: ICD-10-CM

## 2021-08-19 PROCEDURE — G0463: CPT

## 2021-08-19 RX ORDER — LORATADINE 5 MG/5 ML
10 SOLUTION, ORAL ORAL
Qty: 30 | Refills: 0 | Status: COMPLETED | COMMUNITY
Start: 2021-06-26 | End: 2021-08-19

## 2021-08-19 NOTE — ASSESSMENT
[FreeTextEntry1] : F/u in 2 weeks for re-evaluation of back pain \par \par \par Above discussed with Dr. Cortez

## 2021-08-19 NOTE — PHYSICAL EXAM
[No Acute Distress] : no acute distress [No CVA Tenderness] : no CVA  tenderness [Alert and Oriented x3] : oriented to person, place, and time [de-identified] : para spinal muscle tenderness, negative straight leg raise test, knee reflexes +

## 2021-08-19 NOTE — HISTORY OF PRESENT ILLNESS
[Patient Declined  Services] : - None: Patient declined  services [FreeTextEntry8] : 44 y/o male with GERD, HLD, seasonal allergies, presents s/p ER visit on 7/14/21 for back pain. Patient states that last week Thursday he started to experience lower back pain which worsened causing him to visit the ER on Saturday. Patient admits to carrying heavy back pack for ~ 2 hrs on Thursday which he thinks may be the cause of his back pain. Patient was prescribed oxycodone, methocarbamol and motrin and discharged. Patient states that he continues with pain despite being compliant with treatment. Pain is located across lower back, non radiating, 5/10 intensity, aggravated by getting up from sitting position and certain movements. Patient also states that he noted 2 ticks to his R posterior leg ~ 1 month ago one week after a camping trip. Picture of tick resembles dog tick and is typical size. Patient denies fever, chills, headaches, dizziness, chest pain, shortness of breath, cough, palpitations, nausea, vomiting, diarrhea, abdominal pain, leg pain, leg edema, or weakness.\par  [FreeTextEntry3] : Visit continued in shared language\par  [TWNoteComboBox1] : Faroese

## 2021-08-22 DIAGNOSIS — M54.9 DORSALGIA, UNSPECIFIED: ICD-10-CM

## 2021-09-10 ENCOUNTER — OUTPATIENT (OUTPATIENT)
Dept: OUTPATIENT SERVICES | Facility: HOSPITAL | Age: 46
LOS: 1 days | End: 2021-09-10
Payer: SELF-PAY

## 2021-09-10 ENCOUNTER — APPOINTMENT (OUTPATIENT)
Dept: FAMILY MEDICINE | Facility: HOSPITAL | Age: 46
End: 2021-09-10

## 2021-09-10 VITALS
SYSTOLIC BLOOD PRESSURE: 109 MMHG | RESPIRATION RATE: 17 BRPM | DIASTOLIC BLOOD PRESSURE: 63 MMHG | WEIGHT: 150 LBS | HEART RATE: 61 BPM | BODY MASS INDEX: 26.57 KG/M2 | OXYGEN SATURATION: 99 % | TEMPERATURE: 97.4 F

## 2021-09-10 DIAGNOSIS — Z00.00 ENCOUNTER FOR GENERAL ADULT MEDICAL EXAMINATION WITHOUT ABNORMAL FINDINGS: ICD-10-CM

## 2021-09-10 PROCEDURE — G0463: CPT

## 2021-09-12 DIAGNOSIS — M25.562 PAIN IN LEFT KNEE: ICD-10-CM

## 2021-09-20 NOTE — PHYSICAL EXAM
[No Acute Distress] : no acute distress [No Respiratory Distress] : no respiratory distress  [Clear to Auscultation] : lungs were clear to auscultation bilaterally [Normal Rate] : normal rate  [Regular Rhythm] : with a regular rhythm [Normal S1, S2] : normal S1 and S2 [No Murmur] : no murmur heard [No Joint Swelling] : no joint swelling [Alert and Oriented x3] : oriented to person, place, and time [de-identified] : L knee: no point tenderness, normal gait, no edema or erythema, mildly decreased active ROM due to pain

## 2021-09-20 NOTE — HISTORY OF PRESENT ILLNESS
[Patient Declined  Services] : - None: Patient declined  services [FreeTextEntry8] : 46 y/o male with GERD, HLD, seasonal allergies, back pain presents today for c/o L knee pain for ~ 1 week now. Patient denies recent trauma, continues usual work which requires him to be up and down the stairs. Patient denies fever, chills, headaches, dizziness, chest pain, shortness of breath, cough, palpitations, nausea, vomiting, diarrhea, abdominal pain, leg pain, leg edema, or weakness.\par  [FreeTextEntry3] : Visit continued in shared language\par \par  [TWNoteComboBox1] : Cook Islander

## 2021-10-16 ENCOUNTER — APPOINTMENT (OUTPATIENT)
Dept: FAMILY MEDICINE | Facility: HOSPITAL | Age: 46
End: 2021-10-16

## 2021-12-27 ENCOUNTER — OUTPATIENT (OUTPATIENT)
Dept: OUTPATIENT SERVICES | Facility: HOSPITAL | Age: 46
LOS: 1 days | End: 2021-12-27
Payer: SELF-PAY

## 2021-12-27 ENCOUNTER — APPOINTMENT (OUTPATIENT)
Dept: FAMILY MEDICINE | Facility: HOSPITAL | Age: 46
End: 2021-12-27

## 2021-12-27 DIAGNOSIS — Z20.822 CONTACT WITH AND (SUSPECTED) EXPOSURE TO COVID-19: ICD-10-CM

## 2021-12-27 DIAGNOSIS — Z00.00 ENCOUNTER FOR GENERAL ADULT MEDICAL EXAMINATION WITHOUT ABNORMAL FINDINGS: ICD-10-CM

## 2021-12-27 PROCEDURE — U0005: CPT

## 2021-12-27 PROCEDURE — U0003: CPT

## 2021-12-27 PROCEDURE — G0463: CPT

## 2021-12-29 LAB — SARS-COV-2 N GENE NPH QL NAA+PROBE: DETECTED

## 2021-12-29 RX ORDER — BENZONATATE 100 MG/1
100 CAPSULE ORAL
Qty: 21 | Refills: 0 | Status: ACTIVE | COMMUNITY
Start: 2021-12-29 | End: 1900-01-01

## 2022-01-03 PROBLEM — Z20.822 CLOSE EXPOSURE TO COVID-19 VIRUS: Status: ACTIVE | Noted: 2021-02-26

## 2022-01-03 RX ORDER — CYCLOBENZAPRINE HYDROCHLORIDE 7.5 MG/1
7.5 TABLET, FILM COATED ORAL
Qty: 14 | Refills: 0 | Status: COMPLETED | COMMUNITY
Start: 2021-08-19 | End: 2022-01-03

## 2022-01-03 NOTE — HISTORY OF PRESENT ILLNESS
[Other Location: e.g. School (Enter Location, City,State)___] : at [unfilled], at the time of the visit. [Verbal consent obtained from patient] : the patient, [unfilled] [Time Spent: ___ minutes] : I have spent [unfilled] minutes with the patient on the telephone [FreeTextEntry1] : 47 y/o M PMHx HLD, presenting with c/o cough, subjective fever, and headache for past 2 days. Today pt has sore throat and ear discomfort and cough. Denies any fever or HA today. No SOB or CP, no n/v/d. Poor PO intake. States downstairs neighbor was diagnosed with COVID on 12/24. She came over to their apt 12/23. Pt is taking theraflu and advil for symptoms and plenty of lemon water. \par Pt is vaccinated but no booster. Pt's partner has cough only. \par

## 2022-01-07 ENCOUNTER — APPOINTMENT (OUTPATIENT)
Dept: FAMILY MEDICINE | Facility: HOSPITAL | Age: 47
End: 2022-01-07

## 2022-01-07 ENCOUNTER — NON-APPOINTMENT (OUTPATIENT)
Age: 47
End: 2022-01-07

## 2022-01-07 DIAGNOSIS — Z20.822 CONTACT WITH AND (SUSPECTED) EXPOSURE TO COVID-19: ICD-10-CM

## 2022-01-07 DIAGNOSIS — U07.1 COVID-19: ICD-10-CM

## 2022-01-18 PROBLEM — U07.1 INFECTION DUE TO 2019 NOVEL CORONAVIRUS: Status: ACTIVE | Noted: 2021-01-13

## 2022-01-18 NOTE — HISTORY OF PRESENT ILLNESS
[Home] : at home, [unfilled] , at the time of the visit. [Medical Office: ([facility])___] : at the medical office located in  [Verbal consent obtained from patient] : the patient, [unfilled] [Time Spent: ___ minutes] : I have spent [unfilled] minutes with the patient on the telephone [FreeTextEntry1] : Patient is a 45 y/o M with a PMH of HLD who initially tested positive for COVID-19 on 12/27/21. At that time he was experiencing cough, fever, headache, and sore throat. Today all of his symptoms have resolved except a residual cough.

## 2022-02-24 ENCOUNTER — APPOINTMENT (OUTPATIENT)
Dept: FAMILY MEDICINE | Facility: HOSPITAL | Age: 47
End: 2022-02-24

## 2022-02-24 ENCOUNTER — OUTPATIENT (OUTPATIENT)
Dept: OUTPATIENT SERVICES | Facility: HOSPITAL | Age: 47
LOS: 1 days | End: 2022-02-24
Payer: SELF-PAY

## 2022-02-24 VITALS
SYSTOLIC BLOOD PRESSURE: 125 MMHG | HEART RATE: 66 BPM | RESPIRATION RATE: 16 BRPM | OXYGEN SATURATION: 99 % | TEMPERATURE: 97.8 F | DIASTOLIC BLOOD PRESSURE: 82 MMHG

## 2022-02-24 DIAGNOSIS — Z23 ENCOUNTER FOR IMMUNIZATION: ICD-10-CM

## 2022-02-24 DIAGNOSIS — Z00.00 ENCOUNTER FOR GENERAL ADULT MEDICAL EXAMINATION WITHOUT ABNORMAL FINDINGS: ICD-10-CM

## 2022-02-24 PROCEDURE — G0463: CPT

## 2022-02-28 DIAGNOSIS — Z23 ENCOUNTER FOR IMMUNIZATION: ICD-10-CM

## 2022-03-02 PROBLEM — Z23 ENCOUNTER FOR IMMUNIZATION: Status: ACTIVE | Noted: 2022-03-02

## 2022-03-02 PROBLEM — Z23 ENCOUNTER FOR IMMUNIZATION: Status: RESOLVED | Noted: 2020-09-22 | Resolved: 2022-03-02

## 2022-03-07 NOTE — PLAN
[FreeTextEntry1] : #Encounter for immunization\par - advised patient to stay hydrated and use tylenol for fevers/body aches\par - Patient observed for 20 minutes with no complications\par \par D/w Dr. Cortez

## 2022-03-07 NOTE — HISTORY OF PRESENT ILLNESS
[FreeTextEntry1] : Booster [de-identified] : 45 y/o female presents to clinic for booster vaccination. Patient feeling well today. Denies fevers, chills, SOB, CP.

## 2022-05-04 NOTE — ED ADULT NURSE NOTE - NS ED NURSE LEVEL OF CONSCIOUSNESS ORIENTATION
Detail Level: Detailed Quality 226: Preventive Care And Screening: Tobacco Use: Screening And Cessation Intervention: Patient screened for tobacco use and is an ex/non-smoker Oriented - self; Oriented - place; Oriented - time Quality 130: Documentation Of Current Medications In The Medical Record: Current Medications Documented Quality 111:Pneumonia Vaccination Status For Older Adults: Pneumococcal Vaccination not Administered or Previously Received, Reason not Otherwise Specified Quality 110: Preventive Care And Screening: Influenza Immunization: Influenza Immunization Ordered or Recommended, but not Administered due to system reason Quality 431: Preventive Care And Screening: Unhealthy Alcohol Use - Screening: Patient not identified as an unhealthy alcohol user when screened for unhealthy alcohol use using a systematic screening method

## 2022-10-05 ENCOUNTER — APPOINTMENT (OUTPATIENT)
Dept: FAMILY MEDICINE | Facility: HOSPITAL | Age: 47
End: 2022-10-05

## 2022-10-05 ENCOUNTER — RESULT REVIEW (OUTPATIENT)
Age: 47
End: 2022-10-05

## 2022-10-05 ENCOUNTER — OUTPATIENT (OUTPATIENT)
Dept: OUTPATIENT SERVICES | Facility: HOSPITAL | Age: 47
LOS: 1 days | End: 2022-10-05
Payer: SELF-PAY

## 2022-10-05 ENCOUNTER — OUTPATIENT (OUTPATIENT)
Dept: OUTPATIENT SERVICES | Facility: HOSPITAL | Age: 47
LOS: 1 days | End: 2022-10-05
Payer: COMMERCIAL

## 2022-10-05 VITALS
RESPIRATION RATE: 16 BRPM | HEART RATE: 60 BPM | WEIGHT: 145 LBS | DIASTOLIC BLOOD PRESSURE: 62 MMHG | SYSTOLIC BLOOD PRESSURE: 114 MMHG | OXYGEN SATURATION: 99 % | TEMPERATURE: 98.2 F | BODY MASS INDEX: 25.69 KG/M2
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
## 2022-10-05 DIAGNOSIS — Z00.00 ENCOUNTER FOR GENERAL ADULT MEDICAL EXAMINATION WITHOUT ABNORMAL FINDINGS: ICD-10-CM

## 2022-10-05 DIAGNOSIS — M25.562 PAIN IN LEFT KNEE: ICD-10-CM
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
## 2022-10-05 PROCEDURE — G0463: CPT

## 2022-10-06 DIAGNOSIS — M25.562 PAIN IN LEFT KNEE: ICD-10-CM

## 2022-10-06 DIAGNOSIS — R04.2 HEMOPTYSIS: ICD-10-CM

## 2022-10-06 DIAGNOSIS — Z12.89 ENCOUNTER FOR SCREENING FOR MALIGNANT NEOPLASM OF OTHER SITES: ICD-10-CM

## 2022-10-06 DIAGNOSIS — R05.9 COUGH, UNSPECIFIED: ICD-10-CM
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
## 2022-10-06 LAB
RAPID RVP RESULT: NOT DETECTED
SARS-COV-2 RNA PNL RESP NAA+PROBE: NOT DETECTED

## 2022-10-06 NOTE — PHYSICAL EXAM
[Normal] : no joint swelling and grossly normal strength and tone [Coordination Grossly Intact] : coordination grossly intact [No Focal Deficits] : no focal deficits [Normal Gait] : normal gait [de-identified] : 5/5 muscle strength in b/l UE and LE. sensation intact in b/l UE. negative supraspinatus tear test.

## 2022-10-06 NOTE — REVIEW OF SYSTEMS
[Cough] : cough [Joint Pain] : joint pain [Negative] : Gastrointestinal [Muscle Pain] : no muscle pain [Muscle Weakness] : no muscle weakness [Back Pain] : no back pain [FreeTextEntry6] : hemoptysis [FreeTextEntry9] : left shoulder pain. b/l knee pain

## 2022-10-06 NOTE — END OF VISIT
[] : Resident [FreeTextEntry3] : case d/w Dr Avila. Urgent CXR, ED for TB concern.t currently stable. Check CBC, CMP, ANCA, MARIOLA, inflammatory markers, concern for hemoptysis r/o Wegners. ED for CT and admission work up if hemoptysis worsens/persists, Urgent pulmonary follow up for outpatient evaluation if remains clinically stable. Pt is a 1 cig/day x 1 year smoker history. \par CXR reviewed 10/6, negative  RVP negative, bloodwork still pending

## 2022-10-06 NOTE — ASSESSMENT
[FreeTextEntry1] : 47M with PMHX chronic b/l knee pain, GERD, HLD presents for\par \par #Cough\par #Hemoptysis\par -CXR r/o TB\par -RVP\par -MARIOLA and ANCA r/o marilin's\par -CRP, ESR\par \par #Tick bite\par -Lyme antibodies\par \par #Left shoulder pain\par -likely MSK in nature\par -advised pt to use ice on shoulder and rest \par -PT referral\par \par #preventive measure\par -cbc\par -cmp\par -lipids\par -a1c\par \par RTC 1-2 weeks for f/u and cpe\par \par Case discussed with Dr Concepcion \par

## 2022-10-06 NOTE — HISTORY OF PRESENT ILLNESS
[FreeTextEntry8] : 47M with PMHX chronic b/l knee pain, GERD, HLD presents for cough with associated bloody sputum and myalgias x2 weeks. denies sick contacts, fevers/chills, SOB, recent travel, incarceration. pt states symptoms have not improved but also has not worsened. \par pt has tried OTC NSAIDs with minimal relief. pt states he was recently bitten by a tick and would like to be tested for lyme. pt also has been experiencing left shoulder pain for the past few months though denies muscle weakness, numbness/tingling down his arms.  pt also states he is still experiencing bilateral knee pain worse at the end of the day and worse at work (construction).

## 2022-10-10 ENCOUNTER — OUTPATIENT (OUTPATIENT)
Dept: OUTPATIENT SERVICES | Facility: HOSPITAL | Age: 47
LOS: 1 days | End: 2022-10-10
Payer: SELF-PAY

## 2022-10-10 DIAGNOSIS — R05.9 COUGH, UNSPECIFIED: ICD-10-CM

## 2022-10-10 PROCEDURE — 86618 LYME DISEASE ANTIBODY: CPT

## 2022-10-10 PROCEDURE — 36415 COLL VENOUS BLD VENIPUNCTURE: CPT

## 2022-10-10 PROCEDURE — 85025 COMPLETE CBC W/AUTO DIFF WBC: CPT

## 2022-10-10 PROCEDURE — 85652 RBC SED RATE AUTOMATED: CPT

## 2022-10-10 PROCEDURE — 86038 ANTINUCLEAR ANTIBODIES: CPT

## 2022-10-10 PROCEDURE — 82274 ASSAY TEST FOR BLOOD FECAL: CPT

## 2022-10-10 PROCEDURE — 86140 C-REACTIVE PROTEIN: CPT

## 2022-10-10 PROCEDURE — 80053 COMPREHEN METABOLIC PANEL: CPT

## 2022-10-10 PROCEDURE — 86036 ANCA SCREEN EACH ANTIBODY: CPT

## 2022-10-10 PROCEDURE — G0463: CPT

## 2022-10-10 PROCEDURE — 0225U NFCT DS DNA&RNA 21 SARSCOV2: CPT

## 2022-10-10 PROCEDURE — 71046 X-RAY EXAM CHEST 2 VIEWS: CPT | Mod: 26

## 2022-10-10 PROCEDURE — 71046 X-RAY EXAM CHEST 2 VIEWS: CPT

## 2022-10-10 PROCEDURE — 83036 HEMOGLOBIN GLYCOSYLATED A1C: CPT

## 2022-10-10 PROCEDURE — 80061 LIPID PANEL: CPT

## 2022-10-11 LAB
ALBUMIN SERPL ELPH-MCNC: 4.7 G/DL
ALP BLD-CCNC: 99 U/L
ALT SERPL-CCNC: 42 U/L
ANCA AB SER-IMP: NEGATIVE
ANION GAP SERPL CALC-SCNC: 9 MMOL/L
AST SERPL-CCNC: 30 U/L
B BURGDOR AB SER-IMP: NEGATIVE
B BURGDOR IGG+IGM SER QL: 0.05 INDEX
BASOPHILS # BLD AUTO: 0.04 K/UL
BASOPHILS NFR BLD AUTO: 0.7 %
BILIRUB SERPL-MCNC: 0.4 MG/DL
BUN SERPL-MCNC: 18 MG/DL
C-ANCA SER-ACNC: NEGATIVE
CALCIUM SERPL-MCNC: 9.9 MG/DL
CHLORIDE SERPL-SCNC: 103 MMOL/L
CHOLEST SERPL-MCNC: 240 MG/DL
CO2 SERPL-SCNC: 27 MMOL/L
CREAT SERPL-MCNC: 0.91 MG/DL
CRP SERPL-MCNC: <3 MG/L
EGFR: 103 ML/MIN/1.73M2
EOSINOPHIL # BLD AUTO: 0.07 K/UL
EOSINOPHIL NFR BLD AUTO: 1.2 %
ERYTHROCYTE [SEDIMENTATION RATE] IN BLOOD BY WESTERGREN METHOD: 16 MM/HR
ESTIMATED AVERAGE GLUCOSE: 114 MG/DL
GLUCOSE SERPL-MCNC: 102 MG/DL
HBA1C MFR BLD HPLC: 5.6 %
HCT VFR BLD CALC: 49.5 %
HDLC SERPL-MCNC: 70 MG/DL
HGB BLD-MCNC: 16.6 G/DL
IMM GRANULOCYTES NFR BLD AUTO: 0.2 %
LDLC SERPL CALC-MCNC: 148 MG/DL
LYMPHOCYTES # BLD AUTO: 2.06 K/UL
LYMPHOCYTES NFR BLD AUTO: 36.5 %
MAN DIFF?: NORMAL
MCHC RBC-ENTMCNC: 31.3 PG
MCHC RBC-ENTMCNC: 33.5 GM/DL
MCV RBC AUTO: 93.2 FL
MONOCYTES # BLD AUTO: 0.51 K/UL
MONOCYTES NFR BLD AUTO: 9 %
NEUTROPHILS # BLD AUTO: 2.96 K/UL
NEUTROPHILS NFR BLD AUTO: 52.4 %
NONHDLC SERPL-MCNC: 170 MG/DL
P-ANCA TITR SER IF: NEGATIVE
PLATELET # BLD AUTO: 329 K/UL
POTASSIUM SERPL-SCNC: 4.9 MMOL/L
PROT SERPL-MCNC: 7.3 G/DL
RBC # BLD: 5.31 M/UL
RBC # FLD: 12.6 %
SODIUM SERPL-SCNC: 139 MMOL/L
TRIGL SERPL-MCNC: 110 MG/DL
WBC # FLD AUTO: 5.65 K/UL

## 2022-10-12 LAB
ANA PAT FLD IF-IMP: ABNORMAL
ANA SER IF-ACNC: ABNORMAL

## 2022-10-18 LAB — HEMOCCULT STL QL IA: NEGATIVE

## 2022-10-22 ENCOUNTER — APPOINTMENT (OUTPATIENT)
Dept: FAMILY MEDICINE | Facility: HOSPITAL | Age: 47
End: 2022-10-22

## 2022-11-19 ENCOUNTER — OUTPATIENT (OUTPATIENT)
Dept: OUTPATIENT SERVICES | Facility: HOSPITAL | Age: 47
LOS: 1 days | End: 2022-11-19
Payer: SELF-PAY

## 2022-11-19 ENCOUNTER — APPOINTMENT (OUTPATIENT)
Dept: FAMILY MEDICINE | Facility: HOSPITAL | Age: 47
End: 2022-11-19

## 2022-11-19 ENCOUNTER — RESULT REVIEW (OUTPATIENT)
Age: 47
End: 2022-11-19

## 2022-11-19 VITALS
HEIGHT: 63 IN | HEART RATE: 77 BPM | DIASTOLIC BLOOD PRESSURE: 67 MMHG | RESPIRATION RATE: 16 BRPM | OXYGEN SATURATION: 98 % | WEIGHT: 145 LBS | SYSTOLIC BLOOD PRESSURE: 114 MMHG | TEMPERATURE: 97.6 F | BODY MASS INDEX: 25.69 KG/M2

## 2022-11-19 DIAGNOSIS — Z00.00 ENCOUNTER FOR GENERAL ADULT MEDICAL EXAMINATION WITHOUT ABNORMAL FINDINGS: ICD-10-CM

## 2022-11-19 PROCEDURE — G0463: CPT

## 2022-11-19 PROCEDURE — 73030 X-RAY EXAM OF SHOULDER: CPT

## 2022-11-19 PROCEDURE — 73030 X-RAY EXAM OF SHOULDER: CPT | Mod: 26,LT

## 2022-11-21 DIAGNOSIS — M25.512 PAIN IN LEFT SHOULDER: ICD-10-CM

## 2022-11-23 NOTE — HISTORY OF PRESENT ILLNESS
[FreeTextEntry1] : chronic left shoulder pain [de-identified] : 47M with PMHX chronic b/l knee pain, GERD, HLD presents for left shoulder pain follow up. Seen OCT 19th for initial evaluation. PAin has been present for a few months.   Patient pain has improved minimally. He was given a referral for PT last visit but never made  an appointment.  Denies muscle weakness, numbness/tingling down his arms. \par

## 2022-11-23 NOTE — PLAN
[FreeTextEntry1] : #Left shoulder pain\par -likely MSK in nature\par -advised pt to use ice on shoulder and rest , short course of naproxen sent\par -PT referral given again\par - no indication for xray at the moment\par \par rtc in 1-2 months for CPE and shoulder pain follow up\par \par d/w Dr. Jauregui\par

## 2022-11-23 NOTE — PHYSICAL EXAM
[Normal] : normal rate, regular rhythm, normal S1 and S2 and no murmur heard [de-identified] : 5/5 muscle strength in b/l UE and LE. sensation intact in b/l UE. negative supraspinatus tear test.  no joint swelling and grossly normal strength and tone. \par

## 2023-01-24 ENCOUNTER — APPOINTMENT (OUTPATIENT)
Dept: ORTHOPEDIC SURGERY | Facility: HOSPITAL | Age: 48
End: 2023-01-24

## 2023-06-16 ENCOUNTER — OUTPATIENT (OUTPATIENT)
Dept: OUTPATIENT SERVICES | Facility: HOSPITAL | Age: 48
LOS: 1 days | End: 2023-06-16
Payer: SELF-PAY

## 2023-06-16 ENCOUNTER — APPOINTMENT (OUTPATIENT)
Dept: FAMILY MEDICINE | Facility: HOSPITAL | Age: 48
End: 2023-06-16

## 2023-06-16 ENCOUNTER — RESULT REVIEW (OUTPATIENT)
Age: 48
End: 2023-06-16

## 2023-06-16 VITALS
HEART RATE: 60 BPM | OXYGEN SATURATION: 98 % | WEIGHT: 140 LBS | RESPIRATION RATE: 18 BRPM | DIASTOLIC BLOOD PRESSURE: 67 MMHG | TEMPERATURE: 97.9 F | SYSTOLIC BLOOD PRESSURE: 146 MMHG | BODY MASS INDEX: 24.8 KG/M2

## 2023-06-16 DIAGNOSIS — Z00.00 ENCOUNTER FOR GENERAL ADULT MEDICAL EXAMINATION WITHOUT ABNORMAL FINDINGS: ICD-10-CM

## 2023-06-16 DIAGNOSIS — J02.9 ACUTE PHARYNGITIS, UNSPECIFIED: ICD-10-CM

## 2023-06-16 DIAGNOSIS — R09.81 NASAL CONGESTION: ICD-10-CM

## 2023-06-16 PROCEDURE — G0463: CPT

## 2023-06-16 PROCEDURE — 71046 X-RAY EXAM CHEST 2 VIEWS: CPT

## 2023-06-16 PROCEDURE — 71046 X-RAY EXAM CHEST 2 VIEWS: CPT | Mod: 26

## 2023-06-19 DIAGNOSIS — R05.9 COUGH, UNSPECIFIED: ICD-10-CM

## 2023-06-19 DIAGNOSIS — J02.9 ACUTE PHARYNGITIS, UNSPECIFIED: ICD-10-CM

## 2023-06-20 RX ORDER — FLUTICASONE PROPIONATE 50 UG/1
50 SPRAY, METERED NASAL TWICE DAILY
Qty: 5 | Refills: 1 | Status: ACTIVE | COMMUNITY
Start: 2021-05-24 | End: 1900-01-01

## 2023-06-27 PROBLEM — R09.81 NASAL CONGESTION: Status: ACTIVE | Noted: 2023-06-18

## 2023-06-27 PROBLEM — J02.9 SORE THROAT: Status: ACTIVE | Noted: 2023-06-16

## 2023-06-27 NOTE — PHYSICAL EXAM
[Well Nourished] : well nourished [Well Developed] : well developed [Well-Appearing] : well-appearing [Normal Sclera/Conjunctiva] : normal sclera/conjunctiva [EOMI] : extraocular movements intact [Normal Outer Ear/Nose] : the outer ears and nose were normal in appearance [Supple] : supple [No Respiratory Distress] : no respiratory distress  [No Accessory Muscle Use] : no accessory muscle use [Clear to Auscultation] : lungs were clear to auscultation bilaterally [Normal Rate] : normal rate  [Normal S1, S2] : normal S1 and S2 [No Edema] : there was no peripheral edema [Soft] : abdomen soft [Non Tender] : non-tender [Non-distended] : non-distended [Normal Bowel Sounds] : normal bowel sounds [No Joint Swelling] : no joint swelling [No Rash] : no rash [Normal Gait] : normal gait [Normal Affect] : the affect was normal [Alert and Oriented x3] : oriented to person, place, and time [Normal Insight/Judgement] : insight and judgment were intact [de-identified] : + mildly erythematous soft palate [de-identified] : mildly decresed breath sounds; dry coughing spells with inspiration

## 2023-06-27 NOTE — HISTORY OF PRESENT ILLNESS
[FreeTextEntry8] :  47M with PMHX chronic b/l knee pain, GERD, former smoker,  HLD p/f persistent cough; + covid two weeks ago. Pt reports continued dry coughing spells that at times cause him to cough specks of blood. Reports overall fatigue, congestion. Denies fevers, night sweats, chest pain, palpitations,  dyspnea on exertion, nausea, vomiting, diarrhea, urinary frequency or burning with urination.  Patient reports experiencing two episodes where he wakes up frantically from sleep and feels like he can't breathe. Pt requesting CT chest for lung cancer screening.

## 2023-06-27 NOTE — REVIEW OF SYSTEMS
[Chills] : chills [Fatigue] : fatigue [Cough] : cough [Negative] : Psychiatric [Fever] : no fever [Hot Flashes] : no hot flashes [Night Sweats] : no night sweats [Shortness Of Breath] : no shortness of breath [Wheezing] : no wheezing

## 2023-07-05 ENCOUNTER — APPOINTMENT (OUTPATIENT)
Dept: FAMILY MEDICINE | Facility: HOSPITAL | Age: 48
End: 2023-07-05

## 2023-07-05 ENCOUNTER — OUTPATIENT (OUTPATIENT)
Dept: OUTPATIENT SERVICES | Facility: HOSPITAL | Age: 48
LOS: 1 days | End: 2023-07-05
Payer: COMMERCIAL

## 2023-07-05 ENCOUNTER — OUTPATIENT (OUTPATIENT)
Dept: OUTPATIENT SERVICES | Facility: HOSPITAL | Age: 48
LOS: 1 days | End: 2023-07-05

## 2023-07-05 VITALS
RESPIRATION RATE: 17 BRPM | OXYGEN SATURATION: 100 % | DIASTOLIC BLOOD PRESSURE: 73 MMHG | SYSTOLIC BLOOD PRESSURE: 123 MMHG | HEART RATE: 68 BPM | TEMPERATURE: 98.7 F | BODY MASS INDEX: 25.69 KG/M2 | WEIGHT: 145 LBS

## 2023-07-05 DIAGNOSIS — R05.9 COUGH, UNSPECIFIED: ICD-10-CM

## 2023-07-05 DIAGNOSIS — M25.512 PAIN IN LEFT SHOULDER: ICD-10-CM

## 2023-07-05 DIAGNOSIS — Z87.898 PERSONAL HISTORY OF OTHER SPECIFIED CONDITIONS: ICD-10-CM

## 2023-07-05 DIAGNOSIS — Z00.00 ENCOUNTER FOR GENERAL ADULT MEDICAL EXAMINATION WITHOUT ABNORMAL FINDINGS: ICD-10-CM

## 2023-07-05 DIAGNOSIS — I27.20 PULMONARY HYPERTENSION, UNSPECIFIED: ICD-10-CM

## 2023-07-05 DIAGNOSIS — I27.0 PRIMARY PULMONARY HYPERTENSION: ICD-10-CM

## 2023-07-05 DIAGNOSIS — Z00.00 ENCOUNTER FOR GENERAL ADULT MEDICAL EXAMINATION W/OUT ABNORMAL FINDINGS: ICD-10-CM

## 2023-07-05 DIAGNOSIS — Z12.11 ENCOUNTER FOR SCREENING FOR MALIGNANT NEOPLASM OF COLON: ICD-10-CM

## 2023-07-05 DIAGNOSIS — I34.1 NONRHEUMATIC MITRAL (VALVE) PROLAPSE: ICD-10-CM

## 2023-07-05 PROCEDURE — G0463: CPT

## 2023-07-05 PROCEDURE — 85025 COMPLETE CBC W/AUTO DIFF WBC: CPT

## 2023-07-05 PROCEDURE — 80061 LIPID PANEL: CPT

## 2023-07-05 PROCEDURE — 83036 HEMOGLOBIN GLYCOSYLATED A1C: CPT

## 2023-07-05 PROCEDURE — 80053 COMPREHEN METABOLIC PANEL: CPT

## 2023-07-05 PROCEDURE — 82274 ASSAY TEST FOR BLOOD FECAL: CPT

## 2023-07-05 PROCEDURE — 36415 COLL VENOUS BLD VENIPUNCTURE: CPT

## 2023-07-05 NOTE — COUNSELING
[Potential consequences of obesity discussed] : Potential consequences of obesity discussed [Encouraged to increase physical activity] : Encouraged to increase physical activity [Benefits of weight loss discussed] : Benefits of weight loss discussed [Decrease Portions] : decrease portions [____ min/wk Activity] : [unfilled] min/wk activity

## 2023-07-05 NOTE — HEALTH RISK ASSESSMENT
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

## 2023-07-05 NOTE — PHYSICAL EXAM
[Supple] : supple [Coordination Grossly Intact] : coordination grossly intact [No Focal Deficits] : no focal deficits [Normal Gait] : normal gait [Normal] : affect was normal and insight and judgment were intact

## 2023-07-05 NOTE — PHYSICAL EXAM
[Supple] : supple [Coordination Grossly Intact] : coordination grossly intact [Normal Gait] : normal gait [No Focal Deficits] : no focal deficits [Normal] : affect was normal and insight and judgment were intact

## 2023-07-19 LAB
ALBUMIN SERPL ELPH-MCNC: 4.7 G/DL
ALP BLD-CCNC: 108 U/L
ALT SERPL-CCNC: 33 U/L
ANION GAP SERPL CALC-SCNC: 12 MMOL/L
AST SERPL-CCNC: 27 U/L
BASOPHILS # BLD AUTO: 0.05 K/UL
BASOPHILS NFR BLD AUTO: 0.8 %
BILIRUB SERPL-MCNC: 0.3 MG/DL
BUN SERPL-MCNC: 25 MG/DL
CALCIUM SERPL-MCNC: 10.3 MG/DL
CHLORIDE SERPL-SCNC: 103 MMOL/L
CHOLEST SERPL-MCNC: 235 MG/DL
CO2 SERPL-SCNC: 26 MMOL/L
CREAT SERPL-MCNC: 0.95 MG/DL
EGFR: 98 ML/MIN/1.73M2
EOSINOPHIL # BLD AUTO: 0.08 K/UL
EOSINOPHIL NFR BLD AUTO: 1.3 %
ESTIMATED AVERAGE GLUCOSE: 114 MG/DL
GLUCOSE SERPL-MCNC: 96 MG/DL
HBA1C MFR BLD HPLC: 5.6 %
HCT VFR BLD CALC: 48 %
HDLC SERPL-MCNC: 78 MG/DL
HEMOCCULT STL QL IA: NEGATIVE
HGB BLD-MCNC: 15.7 G/DL
IMM GRANULOCYTES NFR BLD AUTO: 0.2 %
LDLC SERPL CALC-MCNC: 118 MG/DL
LYMPHOCYTES # BLD AUTO: 2.46 K/UL
LYMPHOCYTES NFR BLD AUTO: 39.3 %
MAN DIFF?: NORMAL
MCHC RBC-ENTMCNC: 31.5 PG
MCHC RBC-ENTMCNC: 32.7 GM/DL
MCV RBC AUTO: 96.2 FL
MONOCYTES # BLD AUTO: 0.58 K/UL
MONOCYTES NFR BLD AUTO: 9.3 %
NEUTROPHILS # BLD AUTO: 3.08 K/UL
NEUTROPHILS NFR BLD AUTO: 49.1 %
NONHDLC SERPL-MCNC: 157 MG/DL
PLATELET # BLD AUTO: 323 K/UL
POTASSIUM SERPL-SCNC: 4.9 MMOL/L
PROT SERPL-MCNC: 7.4 G/DL
RBC # BLD: 4.99 M/UL
RBC # FLD: 13.6 %
SODIUM SERPL-SCNC: 141 MMOL/L
TRIGL SERPL-MCNC: 198 MG/DL
WBC # FLD AUTO: 6.26 K/UL

## 2023-09-22 ENCOUNTER — OUTPATIENT (OUTPATIENT)
Dept: OUTPATIENT SERVICES | Facility: HOSPITAL | Age: 48
LOS: 1 days | End: 2023-09-22
Payer: SELF-PAY

## 2023-09-22 ENCOUNTER — APPOINTMENT (OUTPATIENT)
Dept: CARDIOLOGY | Facility: HOSPITAL | Age: 48
End: 2023-09-22
Payer: COMMERCIAL

## 2023-09-22 VITALS
BODY MASS INDEX: 25.69 KG/M2 | SYSTOLIC BLOOD PRESSURE: 144 MMHG | RESPIRATION RATE: 16 BRPM | HEART RATE: 56 BPM | DIASTOLIC BLOOD PRESSURE: 84 MMHG | TEMPERATURE: 97.7 F | OXYGEN SATURATION: 99 % | WEIGHT: 145 LBS

## 2023-09-22 DIAGNOSIS — I27.20 PULMONARY HYPERTENSION, UNSPECIFIED: ICD-10-CM

## 2023-09-22 DIAGNOSIS — Z00.00 ENCOUNTER FOR GENERAL ADULT MEDICAL EXAMINATION WITHOUT ABNORMAL FINDINGS: ICD-10-CM

## 2023-09-22 DIAGNOSIS — Z87.891 PERSONAL HISTORY OF NICOTINE DEPENDENCE: ICD-10-CM

## 2023-09-22 DIAGNOSIS — F17.200 NICOTINE DEPENDENCE, UNSPECIFIED, UNCOMPLICATED: ICD-10-CM

## 2023-09-22 DIAGNOSIS — I34.1 NONRHEUMATIC MITRAL (VALVE) PROLAPSE: ICD-10-CM

## 2023-09-22 PROCEDURE — G0463: CPT

## 2023-09-22 PROCEDURE — 93010 ELECTROCARDIOGRAM REPORT: CPT

## 2023-09-22 PROCEDURE — 93005 ELECTROCARDIOGRAM TRACING: CPT

## 2023-09-28 ENCOUNTER — EMERGENCY (EMERGENCY)
Facility: HOSPITAL | Age: 48
LOS: 1 days | Discharge: HOME | End: 2023-09-28
Attending: STUDENT IN AN ORGANIZED HEALTH CARE EDUCATION/TRAINING PROGRAM
Payer: SELF-PAY

## 2023-09-28 VITALS
DIASTOLIC BLOOD PRESSURE: 59 MMHG | OXYGEN SATURATION: 100 % | HEIGHT: 63 IN | WEIGHT: 145.06 LBS | TEMPERATURE: 98 F | HEART RATE: 65 BPM | RESPIRATION RATE: 20 BRPM | SYSTOLIC BLOOD PRESSURE: 119 MMHG

## 2023-09-28 VITALS
SYSTOLIC BLOOD PRESSURE: 101 MMHG | OXYGEN SATURATION: 97 % | TEMPERATURE: 98 F | HEART RATE: 61 BPM | DIASTOLIC BLOOD PRESSURE: 61 MMHG | RESPIRATION RATE: 18 BRPM

## 2023-09-28 PROCEDURE — 99284 EMERGENCY DEPT VISIT MOD MDM: CPT

## 2023-09-28 PROCEDURE — 99284 EMERGENCY DEPT VISIT MOD MDM: CPT | Mod: 25

## 2023-09-28 PROCEDURE — 96372 THER/PROPH/DIAG INJ SC/IM: CPT

## 2023-09-28 RX ORDER — KETOROLAC TROMETHAMINE 30 MG/ML
15 SYRINGE (ML) INJECTION ONCE
Refills: 0 | Status: DISCONTINUED | OUTPATIENT
Start: 2023-09-28 | End: 2023-09-28

## 2023-09-28 RX ORDER — DIAZEPAM 5 MG
5 TABLET ORAL ONCE
Refills: 0 | Status: DISCONTINUED | OUTPATIENT
Start: 2023-09-28 | End: 2023-09-28

## 2023-09-28 RX ORDER — DIAZEPAM 5 MG
1 TABLET ORAL
Qty: 10 | Refills: 0
Start: 2023-09-28 | End: 2023-10-02

## 2023-09-28 RX ORDER — ACETAMINOPHEN 500 MG
650 TABLET ORAL ONCE
Refills: 0 | Status: COMPLETED | OUTPATIENT
Start: 2023-09-28 | End: 2023-09-28

## 2023-09-28 RX ADMIN — Medication 650 MILLIGRAM(S): at 14:36

## 2023-09-28 RX ADMIN — Medication 5 MILLIGRAM(S): at 14:36

## 2023-09-28 RX ADMIN — Medication 15 MILLIGRAM(S): at 16:59

## 2023-09-28 NOTE — ED PROVIDER NOTE - OBJECTIVE STATEMENT
48-year-old male with no medical history presenting with atraumatic back pain, worse today after he got out of his car, trialed Advil and lidocaine patches at home without relief. Has had back pain for 3d that was minimal at home, low back, band-like, which worsened today after getting out of his car. Works in construction. Denies numbness/tingling, urinary/fecal incontinence/retention,  fall/trauma, HA, SOB, CP, abdominal pain, N/V/D/C, imbalance/trouble walking.

## 2023-09-28 NOTE — ED PROVIDER NOTE - PATIENT PORTAL LINK FT
You can access the FollowMyHealth Patient Portal offered by [facility] by registering at the following website: [email or web address]. By joining [employer]’s FollowMyHealth portal, you will also be able to view your health information using other applications (apps) compatible with our system.

## 2023-09-28 NOTE — ED PROVIDER NOTE - PROGRESS NOTE DETAILS
Patient medically cleared for DC to home with spine follow-up.   Return precautions and follow-up instructions discussed and given in discharge paperwork. - Miguel Quintana, PGY-3

## 2023-09-28 NOTE — ED PROVIDER NOTE - CLINICAL SUMMARY MEDICAL DECISION MAKING FREE TEXT BOX
48-year-old male no past medical history presenting with 4 days of  back pain, worse today after bending over while getting out of his car, vital signs unremarkable, physical exam with lumbosacral back tenderness, without paraspinal tenderness, straight leg raise test positive for right lower extremity. Concern for sciatica vs. msk pain, will treat with pain meds, DCTH w/spine follow up.   This represents an initial assessment; work-up and plan subject to change. - Miguel Quintana, PGY-3

## 2023-09-28 NOTE — ED PROVIDER NOTE - NS ED ROS FT
SEE HPI    All other ROS negative unless otherwise specified in HPI.     ~Miguel Quintana M.D. Resident

## 2023-09-28 NOTE — ED PROVIDER NOTE - ATTENDING CONTRIBUTION TO CARE
48 M w/ hx of lumbosacral strain, here w/ low back pain, pt w/ no abd tenderness, has trouble sitting down, pt reports that he has no weakness in the lower legs, it hurts to sit down . no cp, no sob, no nausea no vomiting. On exam, pt w/ tenderness in the lumbar area b/l w/ + straight leg raise. Plan for labs imaging and reassessment. 48 M w/ hx of lumbosacral strain, here w/ low back pain, pt w/ no abd tenderness, has trouble sitting down, pt reports that he has no weakness in the lower legs, it hurts to sit down . no cp, no sob, no nausea no vomiting. no urinary/fecal inconeincen no trauma. pt states that at 7 AM he had pain that progressed this AM, took nsaid for symptoms. no saddle anesthesia. no numbness in the lower legs. On exam, pt w/ tenderness in the lumbar area b/l w/ + straight leg raise on the R. no midline tenderness intact sensation in the b/l lower legs. pt w/ no cva tenderness. pt w/ clear lungs standing up in the hallway, accompanied by wife  Plan for symptomatic treatment likely lumbar strain. unlikely nephrolithiasis, has possible piriformis sx on the R w/ concominant sciatica, likely NSAIDs, outpt follow up w. pcp/spine

## 2023-09-28 NOTE — ED PROVIDER NOTE - DATE/TIME 1
CLINICAL PHARMACY NOTE: MEDS TO BEDS    Total # of Prescriptions Filled: 2   The following medications were delivered to the patient:  Ondansetron 4 mg ODT  Norco 7.5-325 mg    Additional Documentation: 28-Sep-2023 16:26

## 2023-09-28 NOTE — ED PROVIDER NOTE - NSFOLLOWUPINSTRUCTIONS_ED_ALL_ED_FT
You were seen and evaluated in the Emergency Department for your atraumatic back pain.     Clinical examination and history demonstrated no acute evidence of any life-threatening risks to your health as a result of your back pain. You received medication in the Emergency Department for your symptoms.     While emergent evaluation and imaging does not demonstrate any immediate life-threats, we strongly recommend you follow up with our Sports Medicine Clinic for further evaluation of your back pain and possible MRI and physical therapy, by calling the below number:    [facility] Sports Medicine  Sports Medicine  [address] [place] [address]  Phone: [phone]    Please obtain over-the-counter Lidoderm patches from the Pharmacy for topical treatment of your pain, apply to the affected area. Continue to take Tylenol/Motrin with food every 8 hours (as directed on the information packet, follow dosing guidelines).     You have been prescribed Valium, a benzodiazepine medication, for your back pain.   Please do not drink, drive, or operate heavy machinery while taking this medication.  Do not drink alcohol while consuming this medication, as this can cause you to stop breathing.    Should you develop nausea, vomiting, inability to walk properly, numbness or tingling in the extremities, urinary incontinence/retention, numbness in your groin - please immediately return to the Emergency Department for evaluation of your symptoms.     Furthermore we recommend you see your Primary Care Physician for a comprehensive evaluation of your health within the next 48-72 hours.  --  Fue atendido y evaluado en el Departamento de Emergencias por marcus dolor de espalda atraumático.    El examen clínico y la historia clínica no demostraron evidencia aguda de ningún riesgo potencialmente mortal para marcus hemalatha lexi resultado de marcus dolor de espalda. Recibió medicamentos en el Departamento de Emergencias para jac síntomas.    Si clifford la evaluación de emergencia y las imágenes no demuestran ninguna amenaza inmediata para la osiel, le recomendamos encarecidamente que consulte con nuestra [facility] para purnima evaluación adicional de marcus dolor de espalda y purnima posible resonancia magnética y fisioterapia, llamando al siguiente número:    Medicina deportiva de hemalatha de [facility]  Medicina deportiva  [address] [place] [address]  Teléfono: [phone]    Obtenga parches Lidoderm de venta shankar en la farmacia para el tratamiento tópico de marcus dolor y aplíquelos en el área afectada. Continúe tomando Tylenol/Motrin con alimentos cada 8 horas (lexi se indica en el paquete de información, siga las pautas de dosificación).    Le nelson recetado Valium, purnima benzodiazepina, para el dolor de espalda. No geraldine, conduzca ni opere maquinaria pesada mientras jerica cherise medicamento. No geraldine alcohol mientras consume cherise medicamento, ya que esto puede provocar que deje de respirar.    Si desarrolla náuseas, vómitos, incapacidad para caminar adecuadamente, entumecimiento u hormigueo en las extremidades, incontinencia/retención urinaria, entumecimiento en la marcial, regrese inmediatamente al Departamento de Emergencias para purnima evaluación de jac síntomas.    Además te recomendamos acudir a tu [facility] [facility] para purnima evaluación integral de tu hemalatha dentro de las próximas 48-72 horas.

## 2023-09-28 NOTE — ED ADULT NURSE NOTE - OBJECTIVE STATEMENT
Pt is a 48y M, AxO3, no PMH, presents to ED for back pain. Pain began upon waking this morning, localized to lower region of back, describes the pain as a constant "muscle contraction" that squeezes and "lets go". States he had a mild lower backache 2 days ago, but this resolved shortly. Pain is worsened upon walking. Breathing spontaneous and unlabored, abd soft, skin warm and dry. Denies any trauma, chest pain, vomiting, SOB, nausea, fevers, chills,  symptoms. Pt is well appearing, speaking full sentences, family at bedside, resting in bed, VSS, no acute distress noted at this time.

## 2023-09-28 NOTE — ED ADULT NURSE NOTE - NSFALLUNIVINTERV_ED_ALL_ED
Bed/Stretcher in lowest position, wheels locked, appropriate side rails in place/Call bell, personal items and telephone in reach/Instruct patient to call for assistance before getting out of bed/chair/stretcher/Non-slip footwear applied when patient is off stretcher/Marceline to call system/Physically safe environment - no spills, clutter or unnecessary equipment/Purposeful proactive rounding/Room/bathroom lighting operational, light cord in reach

## 2023-09-28 NOTE — ED PROVIDER NOTE - PHYSICAL EXAMINATION
General: WN/WD NAD  Head: Atraumatic  Eyes: EOM grossly in tact, no scleral icterus  ENT: moist mucous membranes  Neurology: A&Ox3, nonfocal, SYKES x 4, gait WNL  Respiratory: normal respiratory effort  CV: Extremities warm and well perfused  Abdominal: Soft, non-distended, no CVA ttp   Back: spine midline, no step-offs or bony deformities, no paraspinal tenderness; +lumbosacral back ttp   MSK: +straight leg raise test on R   Extremities: No edema  Skin: No rashes

## 2023-10-02 ENCOUNTER — APPOINTMENT (OUTPATIENT)
Dept: MRI IMAGING | Facility: HOSPITAL | Age: 48
End: 2023-10-02
Payer: SELF-PAY

## 2023-10-02 ENCOUNTER — RESULT REVIEW (OUTPATIENT)
Age: 48
End: 2023-10-02

## 2023-10-02 ENCOUNTER — OUTPATIENT (OUTPATIENT)
Dept: OUTPATIENT SERVICES | Facility: HOSPITAL | Age: 48
LOS: 1 days | End: 2023-10-02
Payer: SELF-PAY

## 2023-10-02 ENCOUNTER — APPOINTMENT (OUTPATIENT)
Dept: FAMILY MEDICINE | Facility: HOSPITAL | Age: 48
End: 2023-10-02

## 2023-10-02 VITALS
OXYGEN SATURATION: 99 % | TEMPERATURE: 98.2 F | BODY MASS INDEX: 25.69 KG/M2 | SYSTOLIC BLOOD PRESSURE: 117 MMHG | DIASTOLIC BLOOD PRESSURE: 68 MMHG | RESPIRATION RATE: 16 BRPM | HEART RATE: 57 BPM | WEIGHT: 145 LBS

## 2023-10-02 DIAGNOSIS — M54.9 DORSALGIA, UNSPECIFIED: ICD-10-CM

## 2023-10-02 DIAGNOSIS — Z00.00 ENCOUNTER FOR GENERAL ADULT MEDICAL EXAMINATION WITHOUT ABNORMAL FINDINGS: ICD-10-CM

## 2023-10-02 PROCEDURE — 72148 MRI LUMBAR SPINE W/O DYE: CPT

## 2023-10-02 PROCEDURE — 72148 MRI LUMBAR SPINE W/O DYE: CPT | Mod: 26

## 2023-10-02 PROCEDURE — G0463: CPT

## 2023-10-02 RX ORDER — METHYLPREDNISOLONE 4 MG/1
4 TABLET ORAL
Qty: 1 | Refills: 0 | Status: ACTIVE | COMMUNITY
Start: 2023-10-02 | End: 1900-01-01

## 2023-10-02 RX ORDER — NAPROXEN 375 MG/1
375 TABLET ORAL
Qty: 10 | Refills: 0 | Status: COMPLETED | COMMUNITY
Start: 2022-11-22 | End: 2023-10-02

## 2023-10-09 ENCOUNTER — OUTPATIENT (OUTPATIENT)
Dept: OUTPATIENT SERVICES | Facility: HOSPITAL | Age: 48
LOS: 1 days | End: 2023-10-09
Payer: SELF-PAY

## 2023-10-09 DIAGNOSIS — Z00.00 ENCOUNTER FOR GENERAL ADULT MEDICAL EXAMINATION WITHOUT ABNORMAL FINDINGS: ICD-10-CM

## 2023-10-09 DIAGNOSIS — I27.20 PULMONARY HYPERTENSION, UNSPECIFIED: ICD-10-CM

## 2023-10-09 PROCEDURE — 93306 TTE W/DOPPLER COMPLETE: CPT

## 2023-10-09 PROCEDURE — 93306 TTE W/DOPPLER COMPLETE: CPT | Mod: 26

## 2023-10-20 ENCOUNTER — APPOINTMENT (OUTPATIENT)
Dept: FAMILY MEDICINE | Facility: HOSPITAL | Age: 48
End: 2023-10-20
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
## 2024-09-06 ENCOUNTER — EMERGENCY (EMERGENCY)
Facility: HOSPITAL | Age: 49
LOS: 1 days | Discharge: HOME | End: 2024-09-06
Attending: STUDENT IN AN ORGANIZED HEALTH CARE EDUCATION/TRAINING PROGRAM | Admitting: STUDENT IN AN ORGANIZED HEALTH CARE EDUCATION/TRAINING PROGRAM
Payer: MEDICAID

## 2024-09-06 VITALS
WEIGHT: 145.06 LBS | OXYGEN SATURATION: 98 % | TEMPERATURE: 98 F | SYSTOLIC BLOOD PRESSURE: 131 MMHG | RESPIRATION RATE: 16 BRPM | HEIGHT: 63 IN | DIASTOLIC BLOOD PRESSURE: 73 MMHG | HEART RATE: 75 BPM

## 2024-09-06 PROCEDURE — 99284 EMERGENCY DEPT VISIT MOD MDM: CPT

## 2024-09-06 PROCEDURE — 99283 EMERGENCY DEPT VISIT LOW MDM: CPT

## 2024-09-06 RX ORDER — CYCLOBENZAPRINE HCL 10 MG
10 TABLET ORAL ONCE
Refills: 0 | Status: COMPLETED | OUTPATIENT
Start: 2024-09-06 | End: 2024-09-06

## 2024-09-06 RX ORDER — CYCLOBENZAPRINE HCL 10 MG
1 TABLET ORAL
Qty: 21 | Refills: 0
Start: 2024-09-06 | End: 2024-09-12

## 2024-09-06 RX ADMIN — Medication 10 MILLIGRAM(S): at 10:21

## 2024-09-06 NOTE — ED PROVIDER NOTE - NSFOLLOWUPINSTRUCTIONS_ED_ALL_ED_FT
Back Pain    Back pain is very common in adults. The cause of back pain is rarely dangerous and the pain often gets better over time. The cause of your back pain may not be known and may include strain of muscles or ligaments, degeneration of the spinal disks, or arthritis. Occasionally the pain may radiate down your leg(s). Over-the-counter medicines to reduce pain and inflammation are often the most helpful. Stretching and remaining active frequently helps the healing process.     SEEK IMMEDIATE MEDICAL CARE IF YOU HAVE ANY OF THE FOLLOWING SYMPTOMS: bowel or bladder control problems, unusual weakness or numbness in your arms or legs, nausea or vomiting, abdominal pain, fever, dizziness/lightheadedness.     Take acetaminophen 650 mg orally every 6-8 hours for pain control as needed. Please do not exceed 4,000 mg of acetaminophen during a 24 hours period. Acetaminophen can be found in many over-the-counter cold medications as well as opioid medications that may be given for pain.    Take ibuprofen (also known as MOTRIN or ADVIL) 400 mg orally every 6-8 hours for pain control as needed with food to avoid an upset stomach. Ibuprofen can be found in many over-the-counter medications. Please do not take ibuprofen if you have a bleeding disorder, stomach or gastrointestinal ulcer, or liver disease.    If needed, you can alternate these medications so that you can take one medication every 3 hours. For example, at noon take ibuprofen, then at 3PM take acetaminophen, then at 6PM take ibuprofen.     Rest, drink plenty of fluids.  Advance activity as tolerated.  Continue all previously prescribed medications as directed.  Follow up with your PMD 2-3 days and bring copies of your results.  Return to the ER for worsening symptoms,

## 2024-09-06 NOTE — ED ADULT NURSE NOTE - AVIAN FLU EXPOSURE
[Comprehensive Assessment Reviewed] : Comprehensive assessment reviewed [No Action Needed] : No action needed No

## 2024-09-06 NOTE — ED PROVIDER NOTE - PATIENT PORTAL LINK FT
You can access the FollowMyHealth Patient Portal offered by [facility] by registering at the following website: [email or web address]. By joining [employer]’s FollowMyHealth portal, you will also be able to view your health information using other applications (apps) compatible with our system.

## 2024-09-06 NOTE — ED PROVIDER NOTE - CLINICAL SUMMARY MEDICAL DECISION MAKING FREE TEXT BOX
Pt p/w atraumatic low back pain with (-) fevers, chills, (-) saddle anesthesia or perianal numbness,(-) rash, (-) IVDU hx, (-) urinary or bowel incontinence/retention, or focal neurological deficits. DDx: Likely musculoskeletal back pain vs disc herniation/radiculopathy. DDX includes but is not limited to: caudia equina syndrome, vertebral compression fracture, epidural abscess, discitis, vertebral osteomyelitis, cord compression, or bone malignancy; but these are unlikely due to the HPI and exam.  - Pain control: steroids, lidocaine patch, NSAIDs, opioid PRN  - Ambulation and discharge. 48m no significant pmh presenting with lower back pain x few days. Describes gradual onset of b/l lower paralumbar back pain, radiating down LEFT leg, achy in quality, mild pain, exacerbated with positioning. Able to ambulate. Otherwise, the patient denies any neck pain, headaches, chest pain, shortness of breath, n/v/d, abdominal pain, recent illness, fevers, chills, recent spinal/back surgeries or procedures, bowel or bladder incontinence, bowel or bladder retention, constipation, IV drug use, known cancer history, recent weight loss, trauma, weakness, other subjective neurological deficits, numbness/tingling, dysuria, hematuria, rashes.     Pt p/w atraumatic low back pain with (-) fevers, chills, (-) saddle anesthesia or perianal numbness,(-) rash, (-) IVDU hx, (-) urinary or bowel incontinence/retention, or focal neurological deficits. DDx: Likely musculoskeletal back pain vs disc herniation/radiculopathy. DDX includes but is not limited to: cauda equina syndrome, vertebral compression fracture, epidural abscess, discitis, vertebral osteomyelitis, cord compression, or bone malignancy; but these are unlikely due to the HPI and exam.  - Pain control: steroids, lidocaine patch, NSAIDs, opioid PRN  - Ambulation and discharge. F/u spine ortho

## 2024-09-06 NOTE — ED PROVIDER NOTE - OBJECTIVE STATEMENT
48m no significant pmh presenting with lower back pain x few days. Describes gradual onset of b/l lower paralumbar back pain, radiating down LEFT leg, achy in quality, mild pain, exacerbated with positioning. Able to ambulate. Otherwise, the patient denies any neck pain, headaches, chest pain, shortness of breath, n/v/d, abdominal pain, recent illness, fevers, chills, recent spinal/back surgeries or procedures, bowel or bladder incontinence, bowel or bladder retention, constipation, IV drug use, known cancer history, recent weight loss, trauma, weakness, other subjective neurological deficits, numbness/tingling, dysuria, hematuria, rashes.

## 2024-09-06 NOTE — ED PROVIDER NOTE - PHYSICAL EXAMINATION
VITAL SIGNS: I have reviewed nursing notes and confirm.   GEN: Well-developed; well-nourished; in no acute distress. Speaking full sentences.  SKIN: Warm, pink, no rash, no diaphoresis, no cyanosis, well perfused.   HEAD: Normocephalic; atraumatic.    NECK: Supple; non tender.   EYES: Pupils 3mm equal, round, reactive to light and accomodation, conjunctiva and sclera clear. Extra-ocular movements intact bilaterally.  ENT: No nasal discharge; airway clear. Trachea is midline.    CV: RRR. S1, S2 normal; no murmurs, gallops, or rubs. Capillary refill < 2 seconds throughout. Distal pulses intact 2+ throughout.  RESP: CTA bilaterally. No wheezes, rales, or rhonchi.   ABD: Normal bowel sounds, soft, non-distended, non-tender, no rebound, no guarding, no rigidity, no hepatosplenomegaly.  MSK: Normal range of motion and movement of all 4 extremities. No apparent joint or muscular pain throughout.    BACK: No thoracolumbar midline or paravertebral tenderness except for the b/l lower paralumbar area, mild ttp, no rash or ecchymosis, no crepitus..    NEURO: Alert & oriented x 3, Grossly unremarkable. Sensory and motor intact throughout. No focal deficits. Gait: Fluid. Normal speech and coordination.

## 2024-09-06 NOTE — ED ADULT NURSE NOTE - DOES PATIENT HAVE ADVANCE DIRECTIVE
ED General





- General


Chief Complaint: Fever


Stated Complaint: FEVER


Time Seen by Provider: 06/02/20 08:34


Primary Care Provider: 


ZION JOHNSON MD [Primary Care Provider] - Follow up as needed





- [facility] [place]


Notes: 





7-month-old female presents to the ED with mother via EMS for complaints of 

feeling "hot" that mother noticed last night.  Mother states this morning when 

she woke up the patient felt "hot to touch" and called EMS.  She does report 

that her thermometer is broken is not sure what her temperature is.  States that

she did give her Tylenol last night.  Vaccinations are up-to-date per age.  

Decreased eating but drinking without any issues.  More than 5 wet diapers in 24

hours.  No rashes.  Easily consolable.  Denies fevers, chills,  chest 

pain,palpitations,  shortness of breath, dyspnea, nausea, vomiting, diarrhea, 

abdominal pain, hematuria,wheezing, ST, URI, rash.  Patient is teething at this 

time.  Mother states that they do have a friend come from [facility] every 

day to spend time with them, [facility] at this moment time does have a high 

rate of COVID positive patient's.  Mother states they do not social distance or 

wear mask at home.





REVIEW OF SYSTEMS: Per parent reviewed vital signs by RN


CONSTITUTIONAL :  reports fever,  chills, or sweats.  Denies recent illness.


EENT:   Denies eye, ear, throat, or mouth pain or symptoms.  Denies nasal or 

sinus congestion or discharge.  Denies throat, tongue, or mouth swelling or 

difficulty swallowing.


CARDIOVASCULAR:  Denies chest pain.  Denies palpitations or racing or irregular 

heart beat.  Denies ankle edema.


RESPIRATORY:  Denies cough, cold, or chest congestion.  Denies shortness of 

breath, difficulty breathing, or wheezing.


GASTROINTESTINAL:  Denies abdominal pain or distention.  Denies nausea, 

vomiting, or diarrhea.  Denies blood in vomitus, stools, or per rectum.  Denies 

black, tarry stools.  Denies constipation.  


GENITOURINARY:  Denies difficulty urinating, painful urination, burning, 

frequency, blood in urine, or discharge.


MUSCULOSKELETAL:  Denies back or neck pain or stiffness.  Denies joint pain or 

swelling.


SKIN:   Denies rash, lesions or sores.


HEMATOLOGIC :   Denies easy bruising or bleeding.


LYMPHATIC:  Denies swollen, enlarged glands.


NEUROLOGICAL:  Denies confusion or altered mental status.  Denies passing out or

loss of consciousness.  Denies dizziness or lightheadedness.  Denies headache.  

Denies weakness or paralysis or loss of use of either side.  Denies problems 

with gait or speech.  Denies sensory loss, numbness, or tingling.  Denies 

seizures.





ALL OTHER SYSTEMS REVIEWED AND NEGATIVE.





Dictation was performed using Dragon voice recognition software 








PHYSICAL EXAMINATION:





GENERAL: Well-appearing, well-nourished child in no acute distress.





HEAD: Atraumatic, normocephalic.





EYES: Pupils equal round and reactive to light, extraocular movements intact, 

sclera anicteric, conjunctiva are normal. Tears noted





ENT: Nares patent, oropharynx clear without exudates.  Moist mucous membranes.  

Noted teething to lower bilateral molars with gingival red and swollen with 

noted beginnings eruption of molars. 





NECK: Normal range of motion, supple without lymphadenopathy





LUNGS: Breath sounds clear to auscultation bilaterally and equal.  No wheezes 

rales or rhonchi. No retractions





HEART: Regular rate and rhythm without murmurs





ABDOMEN: Soft, nontender, nondistended abdomen.  No guarding, no rebound.  No 

masses appreciated.





Musculoskeletal: Normal range of motion, no pitting or edema.  No cyanosis.





NEUROLOGICAL: Cranial nerves grossly intact.  Normal speech, normal gait exam 

for age.  Normal sensory, motor, and reflex exams.





PSYCH: Normal mood, normal affect.





SKIN: Warm, Dry, normal turgor, no rashes or lesions noted





- Related Data


Allergies/Adverse Reactions: 


                                        





No Known Allergies Allergy (Verified 06/02/20 08:52)


   











Past Medical History





- General


Information source: Patient, Parent





- Social History


Smoking Status: Never Smoker


Family History: Reviewed & Not Pertinent





Physical Exam





- Vital signs


Vitals: 


                                        











Temp Pulse Resp BP Pulse Ox


 


 100.9 F H  158 H  28   120/76   100 


 


 06/02/20 08:25  06/02/20 08:25  06/02/20 08:25  06/02/20 08:25  06/02/20 08:25














Course





- Re-evaluation


Re-evalutation: 





06/02/20 11:24


Low-grade fever on arrival, given ibuprofen in route.  Patient easily consolable

on examination.  Vitals stable.  Nurses notes reviewed.  Rapid strep and rapid 

flu negative, chest x-ray unremarkable.  Urine pediatric bag placed. pt given po

fluids.   Mother refused urine cath.  Encourage p.o. intake for pedibag bag UA. 

while waiting for urine, mother eloped with patient. 


Due to having fever of unknown source along with having exposure to potentially 

positive COVID patient, patient has been tested for COVID outpatient today 

results will be back within the next 5 days.  Discussed with mother that they 

need to practice quarantining, maintaining social distance wearing mask, washing

hands frequently.  Advised to alternate between Tylenol and ibuprofen for 

teething, follow a soft mechanical diet, discussed weight appropriate dosage for

Tylenol and ibuprofen and advised to follow-up with primary care provider within

the next 24 hours. After performing a Medical Screening Examination, I estimate 

there is LOW risk for ACUTE CORONARY SYNDROME, PULMONARY EMBOLI, RESPIRATORY 

FAILURE, SEPSIS OR MENINGITIS, thus I consider the discharge disposition 

reasonable.  I have reevaluated this patient multiple times and no significant 

life threatening changes are noted. The patient and I have discussed the 

diagnosis and risks, and we agree with discharging home with close follow-up. We

also discussed returning to the Emergency Department immediately if new or 

worsening symptoms occur. We have discussed the symptoms which are most 

concerning (e.g., changing or worsening pain, trouble swallowing or breathing, 

neck stiffness, fever) that necessitate immediate return.


06/02/20 11:45








- Vital Signs


Vital signs: 


                                        











Temp Pulse Resp BP Pulse Ox


 


 100.9 F H  142 H  28   120/76   100 


 


 06/02/20 08:25  06/02/20 08:25  06/02/20 08:25  06/02/20 08:25  06/02/20 08:25














Discharge





- Discharge


Clinical Impression: 


 Fever, Teething, COVID-19 virus test result unknown





Condition: Stable


Disposition: ELOPED


Instructions:  COVID-19 Guidance for Persons Under Investigation, Acetaminophen,

Fever ([facility]), Teething Pain ([facility]), Viral Syndrome ([facility])


Additional Instructions: 


Your strep test, flu test, chest x-ray and urinalysis were all normal today.  

You were tested for COVID which results will not be back for 5 days.  Advised to

social distance, or mask when you are outside.  Wash hands frequently.  Follow-

up with your primary care provider if you experience any worsening fevers that 

are not reduced by Tylenol or ibuprofen and dose appropriately, vomiting, 

rashes, less than 5 wet diapers within last 24 hours. 





Please follow-up with your primary care provider within the next 24 hours.





Return immediately for any new or worsening symptoms.





Follow up with primary care provider, call tomorrow to make followup 

appointment.


Referrals: 


ZION JOHNSON MD [Primary Care Provider] - Follow up as needed
No

## 2024-09-06 NOTE — ED ADULT NURSE NOTE - OBJECTIVE STATEMENT
48 year old Male comes to the ER reporting back pain since yesterday after an injury at work. Patient has no incontinence, no numbness or tingling. Able to move all extremities, bed locked and in lowest position for safety.

## 2024-09-07 ENCOUNTER — OUTPATIENT (OUTPATIENT)
Dept: OUTPATIENT SERVICES | Facility: HOSPITAL | Age: 49
LOS: 1 days | End: 2024-09-07
Payer: SELF-PAY

## 2024-09-07 ENCOUNTER — EMERGENCY (EMERGENCY)
Facility: HOSPITAL | Age: 49
LOS: 1 days | Discharge: HOME | End: 2024-09-07
Attending: EMERGENCY MEDICINE | Admitting: EMERGENCY MEDICINE
Payer: MEDICAID

## 2024-09-07 ENCOUNTER — APPOINTMENT (OUTPATIENT)
Dept: FAMILY MEDICINE | Facility: HOSPITAL | Age: 49
End: 2024-09-07

## 2024-09-07 VITALS
HEART RATE: 68 BPM | SYSTOLIC BLOOD PRESSURE: 139 MMHG | DIASTOLIC BLOOD PRESSURE: 74 MMHG | OXYGEN SATURATION: 99 % | RESPIRATION RATE: 17 BRPM | TEMPERATURE: 98 F

## 2024-09-07 VITALS
TEMPERATURE: 98 F | RESPIRATION RATE: 18 BRPM | DIASTOLIC BLOOD PRESSURE: 73 MMHG | WEIGHT: 139.99 LBS | SYSTOLIC BLOOD PRESSURE: 105 MMHG | HEIGHT: 63 IN | HEART RATE: 71 BPM | OXYGEN SATURATION: 99 %

## 2024-09-07 VITALS
HEART RATE: 64 BPM | SYSTOLIC BLOOD PRESSURE: 107 MMHG | DIASTOLIC BLOOD PRESSURE: 74 MMHG | RESPIRATION RATE: 16 BRPM | OXYGEN SATURATION: 98 % | WEIGHT: 140 LBS | BODY MASS INDEX: 24.8 KG/M2 | TEMPERATURE: 97 F

## 2024-09-07 DIAGNOSIS — M54.9 DORSALGIA, UNSPECIFIED: ICD-10-CM

## 2024-09-07 DIAGNOSIS — Z00.00 ENCOUNTER FOR GENERAL ADULT MEDICAL EXAMINATION WITHOUT ABNORMAL FINDINGS: ICD-10-CM

## 2024-09-07 LAB
APPEARANCE UR: CLEAR — SIGNIFICANT CHANGE UP
BACTERIA # UR AUTO: NEGATIVE /HPF — SIGNIFICANT CHANGE UP
BILIRUB UR-MCNC: NEGATIVE — SIGNIFICANT CHANGE UP
COLOR SPEC: YELLOW — SIGNIFICANT CHANGE UP
DIFF PNL FLD: ABNORMAL
EPI CELLS # UR: 0 — SIGNIFICANT CHANGE UP
GLUCOSE UR QL: NEGATIVE MG/DL — SIGNIFICANT CHANGE UP
KETONES UR-MCNC: NEGATIVE MG/DL — SIGNIFICANT CHANGE UP
LEUKOCYTE ESTERASE UR-ACNC: NEGATIVE — SIGNIFICANT CHANGE UP
NITRITE UR-MCNC: NEGATIVE — SIGNIFICANT CHANGE UP
PH UR: 6 — SIGNIFICANT CHANGE UP (ref 5–8)
PROT UR-MCNC: NEGATIVE MG/DL — SIGNIFICANT CHANGE UP
RBC CASTS # UR COMP ASSIST: 4 /HPF — SIGNIFICANT CHANGE UP (ref 0–4)
SP GR SPEC: 1.02 — SIGNIFICANT CHANGE UP (ref 1–1.03)
UROBILINOGEN FLD QL: 0.2 MG/DL — SIGNIFICANT CHANGE UP (ref 0.2–1)
WBC UR QL: 0 /HPF — SIGNIFICANT CHANGE UP (ref 0–5)

## 2024-09-07 PROCEDURE — 74176 CT ABD & PELVIS W/O CONTRAST: CPT | Mod: 26,MC

## 2024-09-07 PROCEDURE — 99284 EMERGENCY DEPT VISIT MOD MDM: CPT

## 2024-09-07 PROCEDURE — 74176 CT ABD & PELVIS W/O CONTRAST: CPT | Mod: MC

## 2024-09-07 PROCEDURE — 81001 URINALYSIS AUTO W/SCOPE: CPT

## 2024-09-07 PROCEDURE — G0463: CPT

## 2024-09-07 PROCEDURE — 99284 EMERGENCY DEPT VISIT MOD MDM: CPT | Mod: 25

## 2024-09-07 RX ORDER — TRAMADOL HYDROCHLORIDE 200 MG/1
50 TABLET, EXTENDED RELEASE ORAL ONCE
Refills: 0 | Status: DISCONTINUED | OUTPATIENT
Start: 2024-09-07 | End: 2024-09-07

## 2024-09-07 RX ORDER — OXYCODONE AND ACETAMINOPHEN 7.5; 325 MG/1; MG/1
1 TABLET ORAL
Qty: 9 | Refills: 0
Start: 2024-09-07 | End: 2024-09-09

## 2024-09-07 RX ADMIN — TRAMADOL HYDROCHLORIDE 50 MILLIGRAM(S): 200 TABLET, EXTENDED RELEASE ORAL at 13:36

## 2024-09-07 RX ADMIN — TRAMADOL HYDROCHLORIDE 50 MILLIGRAM(S): 200 TABLET, EXTENDED RELEASE ORAL at 13:06

## 2024-09-07 NOTE — ED PROVIDER NOTE - PATIENT PORTAL LINK FT
You can access the FollowMyHealth Patient Portal offered by [facility] by registering at the following website: [email or web address]. By joining [facility]’s FollowMyHealth portal, you will also be able to view your health information using other applications (apps) compatible with our system.

## 2024-09-07 NOTE — ED ADULT NURSE NOTE - OBJECTIVE STATEMENT
Patient presents to ED with complaint of right lower back pain x 1 day, Alert and oriented x 4. No nausea, vomiting, dizziness or SOB. Patient presents to ED with complaint of left lower back pain x 1 day, Alert and oriented x 4. No nausea, vomiting, dizziness or SOB.

## 2024-09-07 NOTE — ED PROVIDER NOTE - OBJECTIVE STATEMENT
48-year-old male with lower back pain, patient was evaluated in the ED for the same pain yesterday was treated with NSAIDs/muscle relaxer patient denies any new trauma, however pain was not under control, visited again.  Denies  red flags, fever, cough, nausea vomiting diarrhea, focal weakness/numbness.  Denies any other symptoms

## 2024-09-07 NOTE — PHYSICAL EXAM
[Ill-Appearing] : ill-appearing [Normal Sclera/Conjunctiva] : normal sclera/conjunctiva [EOMI] : extraocular movements intact [No Respiratory Distress] : no respiratory distress  [No Accessory Muscle Use] : no accessory muscle use [Clear to Auscultation] : lungs were clear to auscultation bilaterally [Normal Rate] : normal rate  [Regular Rhythm] : with a regular rhythm [Normal S1, S2] : normal S1 and S2 [No Murmur] : no murmur heard [No Edema] : there was no peripheral edema [Soft] : abdomen soft [Non Tender] : non-tender [Non-distended] : non-distended [Normal Bowel Sounds] : normal bowel sounds [de-identified] : with spinal tenderness mid back and lower back. Pt couldn't tolerate straight leg test

## 2024-09-07 NOTE — ED PROVIDER NOTE - CLINICAL SUMMARY MEDICAL DECISION MAKING FREE TEXT BOX
48-year-old male with a lower back pain, recurrent visit from yesterday, UA showed hematuria CT showed no stone/bony abnormality,  received Percocet, pain improved will discharge with PMD follow-up

## 2024-09-07 NOTE — ED PROVIDER NOTE - NSFOLLOWUPINSTRUCTIONS_ED_ALL_ED_FT
please follow-up with your doctor in 1 to 3 days to reassess symptoms     return to the nearest emergency medicine department immediately for any new/worsening symptoms

## 2024-09-07 NOTE — HISTORY OF PRESENT ILLNESS
[FreeTextEntry8] : 47 y/o M who is here for exacerbation of lower back pain.   Has been having back pain for 1 year, exacerbated 1 week ago, denies any injury, working as . Has been out of work due to the pain. With intermittent left LE numbness. With urinary frequency, no fecal incontinence.  Went to the ED yesterday, no imaging or blood work done. Was given pain meds, and muscle relaxant. Pt declined the offer for steroid at that time.  Patient's pain worsens today, was limping due to pain.

## 2024-09-10 ENCOUNTER — APPOINTMENT (OUTPATIENT)
Dept: FAMILY MEDICINE | Facility: HOSPITAL | Age: 49
End: 2024-09-10

## 2024-09-10 ENCOUNTER — OUTPATIENT (OUTPATIENT)
Dept: OUTPATIENT SERVICES | Facility: HOSPITAL | Age: 49
LOS: 1 days | End: 2024-09-10
Payer: SELF-PAY

## 2024-09-10 VITALS
HEART RATE: 69 BPM | DIASTOLIC BLOOD PRESSURE: 75 MMHG | OXYGEN SATURATION: 100 % | SYSTOLIC BLOOD PRESSURE: 123 MMHG | RESPIRATION RATE: 17 BRPM | TEMPERATURE: 98.2 F

## 2024-09-10 DIAGNOSIS — Z00.00 ENCOUNTER FOR GENERAL ADULT MEDICAL EXAMINATION WITHOUT ABNORMAL FINDINGS: ICD-10-CM

## 2024-09-10 DIAGNOSIS — Z12.5 ENCOUNTER FOR SCREENING FOR MALIGNANT NEOPLASM OF PROSTATE: ICD-10-CM

## 2024-09-10 DIAGNOSIS — R20.2 PARESTHESIA OF SKIN: ICD-10-CM

## 2024-09-10 DIAGNOSIS — M54.9 DORSALGIA, UNSPECIFIED: ICD-10-CM

## 2024-09-10 PROCEDURE — 36415 COLL VENOUS BLD VENIPUNCTURE: CPT

## 2024-09-10 PROCEDURE — G0463: CPT

## 2024-09-10 PROCEDURE — 84154 ASSAY OF PSA FREE: CPT

## 2024-09-10 PROCEDURE — 82607 VITAMIN B-12: CPT

## 2024-09-10 PROCEDURE — 84153 ASSAY OF PSA TOTAL: CPT

## 2024-09-10 PROCEDURE — 82746 ASSAY OF FOLIC ACID SERUM: CPT

## 2024-09-10 RX ORDER — BACLOFEN 10 MG/1
10 TABLET ORAL AT BEDTIME
Qty: 10 | Refills: 0 | Status: ACTIVE | COMMUNITY
Start: 2024-09-10 | End: 1900-01-01

## 2024-09-10 RX ORDER — OMEPRAZOLE 40 MG/1
40 CAPSULE, DELAYED RELEASE ORAL DAILY
Qty: 14 | Refills: 0 | Status: ACTIVE | COMMUNITY
Start: 2024-09-10 | End: 1900-01-01

## 2024-09-10 RX ORDER — IBUPROFEN 800 MG/1
800 TABLET, FILM COATED ORAL 3 TIMES DAILY
Qty: 30 | Refills: 0 | Status: ACTIVE | COMMUNITY
Start: 2024-09-10 | End: 1900-01-01

## 2024-09-11 LAB
PSA FREE FLD-MCNC: 31 %
PSA FREE SERPL-MCNC: 0.22 NG/ML
PSA SERPL-MCNC: 0.71 NG/ML

## 2024-09-11 NOTE — PHYSICAL EXAM
[Normal] : soft, non-tender, non-distended, no masses palpated, no HSM and normal bowel sounds [No CVA Tenderness] : no CVA  tenderness [de-identified] : spinal tenderness in midback and lower back; unable to fuly straighten leg for straight leg test

## 2024-09-11 NOTE — HISTORY OF PRESENT ILLNESS
[FreeTextEntry1] : Hospital Follow up for back pain [de-identified] : 48-year-old male presenting for hospital follow up visit for back pain. Pt was seen in the emergency room on September 7, 2024. Pt had a CT abdomen and CT Lumbar spine done at the time. CT showed no stone or bony abnormality. The patient received percocet in the emergency room, patients pain removed and he was sent home. Pt reports back pain that has been going on since last year. 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 Pt has been attempting to use a back brace and bending his knees to improve his pain. Pts pain was originally intermittent that would happen once in a while because of work and would be relieved with 1-3 tablets of 200mg ibuprofen. Pt would not take Ibuprofen more than once a week. Pt reports that he was sent to physical therapy for his back last year which improved his pain and he has been trying to continue his exercises. The patient states that he uses heat on his back, but the pain is unremitting. Last week he states that the pain was 10/10. He states the pain is now a 6/10 but is constant which was different than his previous pain. Pt states that his pain is spasmodic and occurs more so in the morning. Pt denies any saddle anesthesia, bowel or bladder incontinence, or shooting pain down his legs. Pt is not currently working and has a work note until tomorrow Wednesday September 11. A past MRI from 10/2/23 was reviewed and showed mild lumbar spondylosis. There was also a roudned focus of T1 mixed hypodensity/increased T12 hyperintensity wthout aggressive features in the L2 vertebra. 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 Pt is also concerned about his prostate and would like to be evaluated.

## 2024-09-11 NOTE — REVIEW OF SYSTEMS
[Heartburn] : heartburn [Back Pain] : back pain [Negative] : Neurological [Abdominal Pain] : no abdominal pain [Nausea] : no nausea [Constipation] : no constipation [Vomiting] : no vomiting

## 2024-09-11 NOTE — PHYSICAL EXAM
[Normal] : soft, non-tender, non-distended, no masses palpated, no HSM and normal bowel sounds [No CVA Tenderness] : no CVA  tenderness [de-identified] : spinal tenderness in midback and lower back; unable to fuly straighten leg for straight leg test

## 2024-09-11 NOTE — HISTORY OF PRESENT ILLNESS
[FreeTextEntry1] : Hospital Follow up for back pain [de-identified] : 48-year-old male presenting for hospital follow up visit for back pain. Pt was seen in the emergency room on September 7, 2024. Pt had a CT abdomen and CT Lumbar spine done at the time. CT showed no stone or bony abnormality. The patient received percocet in the emergency room, patients pain removed and he was sent home. Pt reports back pain that has been going on since last year. 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 Pt has been attempting to use a back brace and bending his knees to improve his pain. Pts pain was originally intermittent that would happen once in a while because of work and would be relieved with 1-3 tablets of 200mg ibuprofen. Pt would not take Ibuprofen more than once a week. Pt reports that he was sent to physical therapy for his back last year which improved his pain and he has been trying to continue his exercises. The patient states that he uses heat on his back, but the pain is unremitting. Last week he states that the pain was 10/10. He states the pain is now a 6/10 but is constant which was different than his previous pain. Pt states that his pain is spasmodic and occurs more so in the morning. Pt denies any saddle anesthesia, bowel or bladder incontinence, or shooting pain down his legs. Pt is not currently working and has a work note until tomorrow Wednesday September 11. A past MRI from 10/2/23 was reviewed and showed mild lumbar spondylosis. There was also a roudned focus of T1 mixed hypodensity/increased T12 hyperintensity wthout aggressive features in the L2 vertebra. 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 Pt is also concerned about his prostate and would like to be evaluated.

## 2024-09-17 LAB
FOLATE SERPL-MCNC: 19 NG/ML
VIT B12 SERPL-MCNC: 797 PG/ML

## 2024-09-27 ENCOUNTER — OUTPATIENT (OUTPATIENT)
Dept: OUTPATIENT SERVICES | Facility: HOSPITAL | Age: 49
LOS: 1 days | End: 2024-09-27
Payer: SELF-PAY

## 2024-09-27 ENCOUNTER — APPOINTMENT (OUTPATIENT)
Dept: FAMILY MEDICINE | Facility: HOSPITAL | Age: 49
End: 2024-09-27

## 2024-09-27 ENCOUNTER — OUTPATIENT (OUTPATIENT)
Dept: OUTPATIENT SERVICES | Facility: HOSPITAL | Age: 49
LOS: 1 days | End: 2024-09-27
Payer: COMMERCIAL

## 2024-09-27 VITALS
HEART RATE: 58 BPM | SYSTOLIC BLOOD PRESSURE: 105 MMHG | BODY MASS INDEX: 24.8 KG/M2 | TEMPERATURE: 97.8 F | RESPIRATION RATE: 14 BRPM | WEIGHT: 140 LBS | OXYGEN SATURATION: 97 % | DIASTOLIC BLOOD PRESSURE: 62 MMHG

## 2024-09-27 DIAGNOSIS — E78.5 HYPERLIPIDEMIA, UNSPECIFIED: ICD-10-CM

## 2024-09-27 DIAGNOSIS — M54.9 DORSALGIA, UNSPECIFIED: ICD-10-CM

## 2024-09-27 DIAGNOSIS — Z00.00 ENCOUNTER FOR GENERAL ADULT MEDICAL EXAMINATION WITHOUT ABNORMAL FINDINGS: ICD-10-CM

## 2024-09-27 DIAGNOSIS — Z87.898 PERSONAL HISTORY OF OTHER SPECIFIED CONDITIONS: ICD-10-CM

## 2024-09-27 DIAGNOSIS — R20.2 PARESTHESIA OF SKIN: ICD-10-CM

## 2024-09-27 DIAGNOSIS — Z12.11 ENCOUNTER FOR SCREENING FOR MALIGNANT NEOPLASM OF COLON: ICD-10-CM

## 2024-09-27 DIAGNOSIS — Z87.09 PERSONAL HISTORY OF OTHER DISEASES OF THE RESPIRATORY SYSTEM: ICD-10-CM
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
## 2024-09-27 DIAGNOSIS — U07.1 COVID-19: ICD-10-CM

## 2024-09-27 DIAGNOSIS — F17.200 NICOTINE DEPENDENCE, UNSPECIFIED, UNCOMPLICATED: ICD-10-CM

## 2024-09-27 DIAGNOSIS — Z20.822 CONTACT WITH AND (SUSPECTED) EXPOSURE TO COVID-19: ICD-10-CM

## 2024-09-27 DIAGNOSIS — J35.8 OTHER CHRONIC DISEASES OF TONSILS AND ADENOIDS: ICD-10-CM

## 2024-09-27 PROCEDURE — 80061 LIPID PANEL: CPT

## 2024-09-27 PROCEDURE — 36415 COLL VENOUS BLD VENIPUNCTURE: CPT

## 2024-09-27 PROCEDURE — G0463: CPT

## 2024-09-27 PROCEDURE — 82274 ASSAY TEST FOR BLOOD FECAL: CPT

## 2024-09-27 NOTE — PHYSICAL EXAM
[No Edema] : there was no peripheral edema [Soft] : abdomen soft [Non Tender] : non-tender [Non-distended] : non-distended [No Masses] : no abdominal mass palpated [Grossly Normal Strength/Tone] : grossly normal strength/tone [Normal] : soft, non-tender, non-distended, no masses palpated, no HSM and normal bowel sounds

## 2024-09-30 NOTE — INTERPRETER SERVICES
[Patient Declined  Services] : - None: Patient declined  services [FreeTextEntry3] :  Shared Language Citizen of [place] with MD

## 2024-09-30 NOTE — HISTORY OF PRESENT ILLNESS
[FreeTextEntry1] : Back pain follow up [de-identified] : 49M PMhx HLD, GERD, pulmonary HTN, spinal hemangioma here for follow up of back pain and paresthesia. Patient states paresthesia is resolved and back pain is significantly improved. Has PT appt on Monday.  Reports that his legs feel chronically weak -previous ortho spine surgery referral but patient did not schedule.   No other acute complaints.  [FreeTextEntry8] : 48YO M HERE FOR CSP FIT

## 2024-09-30 NOTE — INTERPRETER SERVICES
[Patient Declined  Services] : - None: Patient declined  services [FreeTextEntry3] :  Shared Language Swedish with MD

## 2024-09-30 NOTE — INTERPRETER SERVICES
[Patient Declined  Services] : - None: Patient declined  services [FreeTextEntry3] :  Shared Language [place] Islander with MD

## 2024-09-30 NOTE — INTERPRETER SERVICES
[Patient Declined  Services] : - None: Patient declined  services [FreeTextEntry3] :  Shared Language Macanese with MD

## 2024-09-30 NOTE — HISTORY OF PRESENT ILLNESS
[FreeTextEntry1] : Back pain follow up [de-identified] : 49M PMhx HLD, GERD, pulmonary HTN, spinal hemangioma here for follow up of back pain and paresthesia. Patient states paresthesia is resolved and back pain is significantly improved. Has PT appt on Monday.  Reports that his legs feel chronically weak -previous ortho spine surgery referral but patient did not schedule.   No other acute complaints.  [FreeTextEntry8] : 50YO M HERE FOR CSP FIT

## 2024-09-30 NOTE — HISTORY OF PRESENT ILLNESS
[FreeTextEntry1] : Back pain follow up [de-identified] : 49M PMhx HLD, GERD, pulmonary HTN, spinal hemangioma here for follow up of back pain and paresthesia. Patient states paresthesia is resolved and back pain is significantly improved. Has PT appt on Monday.  Reports that his legs feel chronically weak -previous ortho spine surgery referral but patient did not schedule.   No other acute complaints.  [FreeTextEntry8] : 48YO M HERE FOR CSP FIT

## 2024-09-30 NOTE — HISTORY OF PRESENT ILLNESS
[FreeTextEntry1] : Back pain follow up [de-identified] : 49M PMhx HLD, GERD, pulmonary HTN, spinal hemangioma here for follow up of back pain and paresthesia. Patient states paresthesia is resolved and back pain is significantly improved. Has PT appt on Monday.  Reports that his legs feel chronically weak -previous ortho spine surgery referral but patient did not schedule.   No other acute complaints.  [FreeTextEntry8] : 50YO M HERE FOR CSP FIT

## 2024-09-30 NOTE — INTERPRETER SERVICES
[Patient Declined  Services] : - None: Patient declined  services [FreeTextEntry3] :  Shared Language Armenian with MD

## 2024-09-30 NOTE — HISTORY OF PRESENT ILLNESS
[FreeTextEntry1] : Back pain follow up [de-identified] : 49M PMhx HLD, GERD, pulmonary HTN, spinal hemangioma here for follow up of back pain and paresthesia. Patient states paresthesia is resolved and back pain is significantly improved. Has PT appt on Monday.  Reports that his legs feel chronically weak -previous ortho spine surgery referral but patient did not schedule.   No other acute complaints.  [FreeTextEntry8] : 48YO M HERE FOR CSP FIT

## 2024-10-01 ENCOUNTER — NON-APPOINTMENT (OUTPATIENT)
Age: 49
End: 2024-10-01

## 2024-10-02 LAB
CHOLEST SERPL-MCNC: 225 MG/DL
HDLC SERPL-MCNC: 68 MG/DL
LDLC SERPL CALC-MCNC: 129 MG/DL
NONHDLC SERPL-MCNC: 157 MG/DL
TRIGL SERPL-MCNC: 157 MG/DL

## 2024-10-04 ENCOUNTER — NON-APPOINTMENT (OUTPATIENT)
Age: 49
End: 2024-10-04

## 2024-10-04 LAB — HEMOCCULT STL QL IA: NEGATIVE

## 2024-10-16 ENCOUNTER — OUTPATIENT (OUTPATIENT)
Dept: OUTPATIENT SERVICES | Facility: HOSPITAL | Age: 49
LOS: 1 days | End: 2024-10-16
Payer: SELF-PAY

## 2024-10-16 ENCOUNTER — APPOINTMENT (OUTPATIENT)
Dept: ORTHOPEDIC SURGERY | Facility: HOSPITAL | Age: 49
End: 2024-10-16

## 2024-10-16 VITALS
BODY MASS INDEX: 24.8 KG/M2 | SYSTOLIC BLOOD PRESSURE: 117 MMHG | TEMPERATURE: 97.8 F | RESPIRATION RATE: 14 BRPM | WEIGHT: 140 LBS | HEIGHT: 63 IN | HEART RATE: 58 BPM | DIASTOLIC BLOOD PRESSURE: 67 MMHG | OXYGEN SATURATION: 96 %

## 2024-10-16 DIAGNOSIS — M54.9 DORSALGIA, UNSPECIFIED: ICD-10-CM

## 2024-10-16 DIAGNOSIS — M79.609 PAIN IN UNSPECIFIED LIMB: ICD-10-CM

## 2024-10-16 PROCEDURE — G0463: CPT

## 2024-10-17 DIAGNOSIS — M54.9 DORSALGIA, UNSPECIFIED: ICD-10-CM

## 2024-10-31 ENCOUNTER — APPOINTMENT (OUTPATIENT)
Dept: FAMILY MEDICINE | Facility: HOSPITAL | Age: 49
End: 2024-10-31

## 2024-12-02 ENCOUNTER — EMERGENCY (EMERGENCY)
Facility: HOSPITAL | Age: 49
LOS: 0 days | Discharge: HOME | End: 2024-12-02
Attending: EMERGENCY MEDICINE
Payer: MEDICAID

## 2024-12-02 VITALS
WEIGHT: 144.4 LBS | HEART RATE: 60 BPM | DIASTOLIC BLOOD PRESSURE: 64 MMHG | RESPIRATION RATE: 16 BRPM | TEMPERATURE: 98 F | OXYGEN SATURATION: 100 % | SYSTOLIC BLOOD PRESSURE: 116 MMHG

## 2024-12-02 DIAGNOSIS — E78.5 HYPERLIPIDEMIA, UNSPECIFIED: ICD-10-CM

## 2024-12-02 DIAGNOSIS — R07.9 CHEST PAIN, UNSPECIFIED: ICD-10-CM

## 2024-12-02 DIAGNOSIS — R52 PAIN, UNSPECIFIED: ICD-10-CM

## 2024-12-02 DIAGNOSIS — R05.8 OTHER SPECIFIED COUGH: ICD-10-CM

## 2024-12-02 DIAGNOSIS — U07.1 COVID-19: ICD-10-CM

## 2024-12-02 PROCEDURE — 99284 EMERGENCY DEPT VISIT MOD MDM: CPT

## 2024-12-02 PROCEDURE — 99283 EMERGENCY DEPT VISIT LOW MDM: CPT | Mod: 25

## 2024-12-02 PROCEDURE — 71046 X-RAY EXAM CHEST 2 VIEWS: CPT | Mod: 26

## 2024-12-02 PROCEDURE — 71046 X-RAY EXAM CHEST 2 VIEWS: CPT

## 2024-12-02 RX ORDER — IBUPROFEN 200 MG
600 TABLET ORAL ONCE
Refills: 0 | Status: COMPLETED | OUTPATIENT
Start: 2024-12-02 | End: 2024-12-02

## 2024-12-02 RX ORDER — IBUPROFEN 200 MG
1 TABLET ORAL
Qty: 12 | Refills: 0
Start: 2024-12-02 | End: 2024-12-04

## 2024-12-02 RX ORDER — BENZONATATE 100 MG/1
1 CAPSULE ORAL
Qty: 12 | Refills: 0
Start: 2024-12-02 | End: 2024-12-05

## 2024-12-02 RX ORDER — BENZONATATE 100 MG/1
100 CAPSULE ORAL ONCE
Refills: 0 | Status: COMPLETED | OUTPATIENT
Start: 2024-12-02 | End: 2024-12-02

## 2024-12-02 RX ADMIN — BENZONATATE 100 MILLIGRAM(S): 100 CAPSULE ORAL at 18:23

## 2024-12-02 RX ADMIN — Medication 600 MILLIGRAM(S): at 18:23

## 2024-12-02 NOTE — ED STATDOCS - OBJECTIVE STATEMENT
50 y/o male with PMHx of HLD presents to the ED c/o productive cough with associated chest pain and body aches x4 days. Tested positive on home COVID test. No other complaints at this time.   PCP: Dr. Block in [place] 50 y/o male with PMHx of HLD presents to the ED c/o body aches and productive cough with associated chest pain x4 days. Tested positive on home COVID test. No other complaints at this time.   PCP: Dr. Block in [place]

## 2024-12-02 NOTE — ED ADULT NURSE NOTE - OBJECTIVE STATEMENT
Pt presents to er with complaints of productive cough with green sputum since last Thursday, denies fevers, sick contacts.Covid+, medicated and cleared for dc home.

## 2024-12-02 NOTE — ED STATDOCS - PROGRESS NOTE DETAILS
48 y/o male with PMHx of HLD presents to the ED c/o body aches and productive cough with associated chest pain x4 days. Tested positive on home COVID test. No other complaints at this time.    Pt. has CP only with cough.    PCP: Dr. Block in [place] Plan for CXR, antiinflammatory.  Inna Pryor PA-C Plan for CXR, antiinflammatory.  Pt. has had COVID 4 x.  Aware of contact precautions.   Inna Pryor PA-C

## 2024-12-02 NOTE — ED STATDOCS - CLINICAL SUMMARY MEDICAL DECISION MAKING FREE TEXT BOX
Patient with known COVID.  Chest x-rays performed and independently interpreted by myself reveal no acute findings.  Discharged home in good condition with supportive care.  Strict turn precaution given for any worsening.  Patient verbalized understanding agree with plan.

## 2024-12-02 NOTE — ED ADULT TRIAGE NOTE - CHIEF COMPLAINT QUOTE
Pt presents to er with complaints of productive cough with green sputum since last Thursday, denies fevers, sick contacts.

## 2024-12-02 NOTE — ED STATDOCS - PATIENT PORTAL LINK FT
You can access the FollowMyHealth Patient Portal offered by [facility] by registering at the following website: [email or web address]. By joining [employer]’s FollowMyHealth portal, you will also be able to view your health information using other applications (apps) compatible with our system.

## 2024-12-07 ENCOUNTER — OUTPATIENT (OUTPATIENT)
Dept: OUTPATIENT SERVICES | Facility: HOSPITAL | Age: 49
LOS: 1 days | End: 2024-12-07
Payer: SELF-PAY

## 2024-12-07 ENCOUNTER — APPOINTMENT (OUTPATIENT)
Dept: FAMILY MEDICINE | Facility: HOSPITAL | Age: 49
End: 2024-12-07
Payer: COMMERCIAL

## 2024-12-07 VITALS
OXYGEN SATURATION: 98 % | TEMPERATURE: 97.7 F | DIASTOLIC BLOOD PRESSURE: 68 MMHG | BODY MASS INDEX: 25.15 KG/M2 | SYSTOLIC BLOOD PRESSURE: 106 MMHG | WEIGHT: 142 LBS | RESPIRATION RATE: 16 BRPM | HEART RATE: 56 BPM

## 2024-12-07 DIAGNOSIS — U07.1 COVID-19: ICD-10-CM

## 2024-12-07 DIAGNOSIS — M54.9 DORSALGIA, UNSPECIFIED: ICD-10-CM

## 2024-12-07 DIAGNOSIS — R07.9 CHEST PAIN, UNSPECIFIED: ICD-10-CM

## 2024-12-07 DIAGNOSIS — Z00.00 ENCOUNTER FOR GENERAL ADULT MEDICAL EXAMINATION WITHOUT ABNORMAL FINDINGS: ICD-10-CM

## 2024-12-07 PROCEDURE — 93010 ELECTROCARDIOGRAM REPORT: CPT

## 2024-12-07 PROCEDURE — 93005 ELECTROCARDIOGRAM TRACING: CPT

## 2024-12-07 PROCEDURE — G0463: CPT

## 2024-12-07 RX ORDER — BENZONATATE 100 MG/1
100 CAPSULE ORAL 3 TIMES DAILY
Qty: 30 | Refills: 0 | Status: ACTIVE | COMMUNITY
Start: 2024-12-07 | End: 1900-01-01

## 2024-12-11 ENCOUNTER — OUTPATIENT (OUTPATIENT)
Dept: OUTPATIENT SERVICES | Facility: HOSPITAL | Age: 49
LOS: 1 days | End: 2024-12-11
Payer: SELF-PAY

## 2024-12-11 ENCOUNTER — RESULT REVIEW (OUTPATIENT)
Age: 49
End: 2024-12-11

## 2024-12-11 DIAGNOSIS — Z00.00 ENCOUNTER FOR GENERAL ADULT MEDICAL EXAMINATION WITHOUT ABNORMAL FINDINGS: ICD-10-CM

## 2024-12-11 PROCEDURE — 72148 MRI LUMBAR SPINE W/O DYE: CPT

## 2024-12-11 PROCEDURE — 72148 MRI LUMBAR SPINE W/O DYE: CPT | Mod: 26

## 2024-12-27 ENCOUNTER — RESULT REVIEW (OUTPATIENT)
Age: 49
End: 2024-12-27

## 2024-12-27 ENCOUNTER — OUTPATIENT (OUTPATIENT)
Dept: OUTPATIENT SERVICES | Facility: HOSPITAL | Age: 49
LOS: 1 days | End: 2024-12-27
Payer: SELF-PAY

## 2024-12-27 DIAGNOSIS — R07.9 CHEST PAIN, UNSPECIFIED: ICD-10-CM

## 2024-12-27 DIAGNOSIS — Z00.00 ENCOUNTER FOR GENERAL ADULT MEDICAL EXAMINATION WITHOUT ABNORMAL FINDINGS: ICD-10-CM

## 2024-12-27 PROCEDURE — 93306 TTE W/DOPPLER COMPLETE: CPT

## 2024-12-27 PROCEDURE — 93306 TTE W/DOPPLER COMPLETE: CPT | Mod: 26

## 2025-01-22 ENCOUNTER — APPOINTMENT (OUTPATIENT)
Age: 50
End: 2025-01-22

## 2025-02-26 ENCOUNTER — RESULT REVIEW (OUTPATIENT)
Age: 50
End: 2025-02-26

## 2025-02-26 ENCOUNTER — APPOINTMENT (OUTPATIENT)
Age: 50
End: 2025-02-26

## 2025-02-26 ENCOUNTER — OUTPATIENT (OUTPATIENT)
Dept: OUTPATIENT SERVICES | Facility: HOSPITAL | Age: 50
LOS: 1 days | End: 2025-02-26
Payer: SELF-PAY

## 2025-02-26 VITALS
SYSTOLIC BLOOD PRESSURE: 114 MMHG | WEIGHT: 144 LBS | TEMPERATURE: 97.6 F | OXYGEN SATURATION: 99 % | RESPIRATION RATE: 16 BRPM | DIASTOLIC BLOOD PRESSURE: 69 MMHG | BODY MASS INDEX: 25.51 KG/M2 | HEART RATE: 58 BPM

## 2025-02-26 DIAGNOSIS — M25.512 PAIN IN LEFT SHOULDER: ICD-10-CM

## 2025-02-26 DIAGNOSIS — M47.816 SPONDYLOSIS WITHOUT MYELOPATHY OR RADICULOPATHY, LUMBAR REGION: ICD-10-CM

## 2025-02-26 DIAGNOSIS — M48.061 SPINAL STENOSIS, LUMBAR REGION WITHOUT NEUROGENIC CLAUDICATION: ICD-10-CM

## 2025-02-26 DIAGNOSIS — Z00.00 ENCOUNTER FOR GENERAL ADULT MEDICAL EXAMINATION WITHOUT ABNORMAL FINDINGS: ICD-10-CM

## 2025-02-26 DIAGNOSIS — M47.816 SPONDYLOSIS W/OUT MYELOPATHY OR RADICULOPATHY, LUMBAR REGION: ICD-10-CM

## 2025-02-26 PROCEDURE — G0463: CPT

## 2025-03-03 ENCOUNTER — OUTPATIENT (OUTPATIENT)
Dept: OUTPATIENT SERVICES | Facility: HOSPITAL | Age: 50
LOS: 1 days | End: 2025-03-03
Payer: SELF-PAY

## 2025-03-03 DIAGNOSIS — M25.512 PAIN IN LEFT SHOULDER: ICD-10-CM

## 2025-03-03 DIAGNOSIS — Z00.00 ENCOUNTER FOR GENERAL ADULT MEDICAL EXAMINATION WITHOUT ABNORMAL FINDINGS: ICD-10-CM

## 2025-03-03 PROCEDURE — 72050 X-RAY EXAM NECK SPINE 4/5VWS: CPT | Mod: 26

## 2025-03-03 PROCEDURE — 72050 X-RAY EXAM NECK SPINE 4/5VWS: CPT

## 2025-03-07 ENCOUNTER — NON-APPOINTMENT (OUTPATIENT)
Age: 50
End: 2025-03-07

## 2025-07-13 ENCOUNTER — EMERGENCY (EMERGENCY)
Facility: HOSPITAL | Age: 50
LOS: 0 days | Discharge: HOME | End: 2025-07-13
Attending: STUDENT IN AN ORGANIZED HEALTH CARE EDUCATION/TRAINING PROGRAM
Payer: MEDICAID

## 2025-07-13 VITALS
OXYGEN SATURATION: 98 % | SYSTOLIC BLOOD PRESSURE: 119 MMHG | DIASTOLIC BLOOD PRESSURE: 63 MMHG | RESPIRATION RATE: 18 BRPM | WEIGHT: 148.59 LBS | HEART RATE: 66 BPM | TEMPERATURE: 98 F

## 2025-07-13 DIAGNOSIS — G89.29 OTHER CHRONIC PAIN: ICD-10-CM

## 2025-07-13 DIAGNOSIS — M54.50 LOW BACK PAIN, UNSPECIFIED: ICD-10-CM

## 2025-07-13 DIAGNOSIS — E78.5 HYPERLIPIDEMIA, UNSPECIFIED: ICD-10-CM

## 2025-07-13 PROCEDURE — 72100 X-RAY EXAM L-S SPINE 2/3 VWS: CPT | Mod: 26

## 2025-07-13 PROCEDURE — 96372 THER/PROPH/DIAG INJ SC/IM: CPT

## 2025-07-13 PROCEDURE — 72100 X-RAY EXAM L-S SPINE 2/3 VWS: CPT

## 2025-07-13 PROCEDURE — 99283 EMERGENCY DEPT VISIT LOW MDM: CPT | Mod: 25

## 2025-07-13 PROCEDURE — 99284 EMERGENCY DEPT VISIT MOD MDM: CPT

## 2025-07-13 RX ORDER — KETOROLAC TROMETHAMINE 30 MG/ML
60 INJECTION, SOLUTION INTRAMUSCULAR; INTRAVENOUS ONCE
Refills: 0 | Status: DISCONTINUED | OUTPATIENT
Start: 2025-07-13 | End: 2025-07-13

## 2025-07-13 RX ORDER — OXYCODONE HYDROCHLORIDE 30 MG/1
1 TABLET ORAL
Qty: 9 | Refills: 0
Start: 2025-07-13

## 2025-07-13 RX ADMIN — KETOROLAC TROMETHAMINE 60 MILLIGRAM(S): 30 INJECTION, SOLUTION INTRAMUSCULAR; INTRAVENOUS at 11:09

## 2025-07-13 NOTE — ED ADULT NURSE NOTE - NSFALLUNIVINTERV_ED_ALL_ED
Bed/Stretcher in lowest position, wheels locked, appropriate side rails in place/Call bell, personal items and telephone in reach/Instruct patient to call for assistance before getting out of bed/chair/stretcher/Non-slip footwear applied when patient is off stretcher/[place] to call system/Physically safe environment - no spills, clutter or unnecessary equipment/Purposeful proactive rounding/Room/bathroom lighting operational, light cord in reach

## 2025-07-13 NOTE — ED ADULT NURSE NOTE - OBJECTIVE STATEMENT
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 reports taking motrin and flexeril without improvement. visitor at bedside. safety and comfort measures in place.

## 2025-07-13 NOTE — ED STATDOCS - CLINICAL SUMMARY MEDICAL DECISION MAKING FREE TEXT BOX
49-year-old male presenting with atraumatic acute on chronic low back pain.  Likely strained at work (he works as a ).  Patient is neurovascularly intact, no red flag symptoms.  Plan for x-ray, pain meds.  Patient has follow-up appointment with orthopedic surgeon tomorrow.  Stable for discharge.

## 2025-07-13 NOTE — ED STATDOCS - PHYSICAL EXAMINATION
GENERAL: A&Ox4, non-toxic appearing, no acute distress  HEENT: NCAT, EOMI, oral mucosa moist, normal conjunctiva  RESP: no respiratory distress, speaking in full sentences  CV: RRR  MSK: no visible deformities, no midline spinal tenderness, bilateral paraspinal tenderness over lower lumbar spine, lidocaine patch over sacral spine  NEURO: no focal sensory or motor deficits, CN 2-12 grossly intact, ambulatory with steady gait, 5/5 strength in lower extremities  SKIN: warm, normal color, well perfused, no rash  PSYCH: normal affect

## 2025-07-13 NOTE — ED STATDOCS - PATIENT PORTAL LINK FT
You can access the FollowMyHealth Patient Portal offered by [facility] by registering at the following website: [email or web address]. By joining [employer]’s FollowMyHealth portal, you will also be able to view your health information using other applications (apps) compatible with our system.

## 2025-07-13 NOTE — ED ADULT TRIAGE NOTE - CHIEF COMPLAINT QUOTE
pt presents to the ED for lower back pain worsening this week. reports injury 2 years ago and was in [place] rehab last year. reports Tuesday his back pain flared up again, put on a brace to alleviate the symptoms. ambulating without difficulty. AREN

## 2025-07-13 NOTE — ED STATDOCS - OBJECTIVE STATEMENT
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 Pt reports taking Ibuprofen, Flexeril x2 doses for pain without improvement. Pt states he has a 2 year history of back pain, has had therapy in [place]. Pt is able to ambulate with some difficulty. No urinary symptoms. Pt states he has orthopedist appointment tomorrow. 48 y/o male with PMHx of HLD presents to the ED c/o lower back pain for 5 days. 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 Pt reports taking Ibuprofen, Flexeril x2 doses for pain without improvement. Pt states he has a 2 year history of back pain, has had therapy in [place] previously. Pt is able to ambulate with some difficulty. No urinary retention or stool incontinence, no saddle anesthesia. Pt states he has orthopedist appointment tomorrow.

## 2025-07-13 NOTE — ED STATDOCS - PROGRESS NOTE DETAILS
patient with low back pain, no red flags, has f/u with ortho tomorrow, xray negative, will treat pain and reviewed ER return precautions -Cheryl Steen PA-C

## 2025-07-13 NOTE — ED ADULT NURSE NOTE - CHIEF COMPLAINT QUOTE
pt presents to the ED for lower back pain worsening this week. reports injury 2 years ago and was in [place] rehab last year. reports Tuesday his back pain flared up again, put on a brace to alleviate the symptoms. ambulating without difficulty. AREN

## 2025-07-14 ENCOUNTER — OUTPATIENT (OUTPATIENT)
Dept: OUTPATIENT SERVICES | Facility: HOSPITAL | Age: 50
LOS: 1 days | End: 2025-07-14
Payer: SELF-PAY

## 2025-07-14 ENCOUNTER — APPOINTMENT (OUTPATIENT)
Age: 50
End: 2025-07-14

## 2025-07-14 VITALS
DIASTOLIC BLOOD PRESSURE: 75 MMHG | WEIGHT: 149 LBS | RESPIRATION RATE: 16 BRPM | SYSTOLIC BLOOD PRESSURE: 129 MMHG | BODY MASS INDEX: 26.39 KG/M2 | OXYGEN SATURATION: 98 % | HEART RATE: 68 BPM | TEMPERATURE: 98 F

## 2025-07-14 DIAGNOSIS — Z00.00 ENCOUNTER FOR GENERAL ADULT MEDICAL EXAMINATION WITHOUT ABNORMAL FINDINGS: ICD-10-CM

## 2025-07-14 DIAGNOSIS — M48.061 SPINAL STENOSIS, LUMBAR REGION WITHOUT NEUROGENIC CLAUDICATION: ICD-10-CM

## 2025-07-14 PROCEDURE — G0463: CPT

## 2025-07-14 RX ORDER — METHYLPREDNISOLONE 4 MG/1
4 TABLET ORAL
Qty: 1 | Refills: 0 | Status: DISCONTINUED | COMMUNITY
Start: 2025-07-14 | End: 2025-07-14

## 2025-07-14 RX ORDER — METHYLPREDNISOLONE 4 MG/1
4 TABLET ORAL
Qty: 1 | Refills: 0 | Status: ACTIVE | COMMUNITY
Start: 2025-07-14 | End: 1900-01-01

## 2025-07-15 RX ORDER — OXYCODONE 5 MG/1
5 TABLET ORAL
Refills: 0 | Status: ACTIVE | COMMUNITY

## 2025-08-28 ENCOUNTER — APPOINTMENT (OUTPATIENT)
Age: 50
End: 2025-08-28

## 2025-08-28 ENCOUNTER — OUTPATIENT (OUTPATIENT)
Dept: OUTPATIENT SERVICES | Facility: HOSPITAL | Age: 50
LOS: 1 days | End: 2025-08-28
Payer: SELF-PAY

## 2025-08-28 VITALS
DIASTOLIC BLOOD PRESSURE: 74 MMHG | OXYGEN SATURATION: 98 % | SYSTOLIC BLOOD PRESSURE: 122 MMHG | BODY MASS INDEX: 26.22 KG/M2 | HEIGHT: 63 IN | WEIGHT: 148 LBS | TEMPERATURE: 97.9 F | RESPIRATION RATE: 14 BRPM | HEART RATE: 61 BPM

## 2025-08-28 DIAGNOSIS — R39.11 HESITANCY OF MICTURITION: ICD-10-CM

## 2025-08-28 DIAGNOSIS — M47.816 SPONDYLOSIS WITHOUT MYELOPATHY OR RADICULOPATHY, LUMBAR REGION: ICD-10-CM

## 2025-08-28 DIAGNOSIS — Z12.2 ENCOUNTER FOR SCREENING FOR MALIGNANT NEOPLASM OF RESPIRATORY ORGANS: ICD-10-CM

## 2025-08-28 DIAGNOSIS — Z00.00 ENCOUNTER FOR GENERAL ADULT MEDICAL EXAMINATION WITHOUT ABNORMAL FINDINGS: ICD-10-CM

## 2025-08-28 DIAGNOSIS — F17.200 NICOTINE DEPENDENCE, UNSPECIFIED, UNCOMPLICATED: ICD-10-CM

## 2025-08-28 DIAGNOSIS — Z00.00 ENCOUNTER FOR GENERAL ADULT MEDICAL EXAMINATION W/OUT ABNORMAL FINDINGS: ICD-10-CM

## 2025-08-28 DIAGNOSIS — Z87.898 PERSONAL HISTORY OF OTHER SPECIFIED CONDITIONS: ICD-10-CM

## 2025-08-28 DIAGNOSIS — E78.5 HYPERLIPIDEMIA, UNSPECIFIED: ICD-10-CM

## 2025-08-28 DIAGNOSIS — H53.9 UNSPECIFIED VISUAL DISTURBANCE: ICD-10-CM

## 2025-08-28 DIAGNOSIS — M47.816 SPONDYLOSIS W/OUT MYELOPATHY OR RADICULOPATHY, LUMBAR REGION: ICD-10-CM

## 2025-08-28 DIAGNOSIS — Z86.79 PERSONAL HISTORY OF OTHER DISEASES OF THE CIRCULATORY SYSTEM: ICD-10-CM

## 2025-08-28 PROCEDURE — G0463: CPT

## 2025-09-09 ENCOUNTER — NON-APPOINTMENT (OUTPATIENT)
Age: 50
End: 2025-09-09

## 2025-09-09 PROCEDURE — 80048 BASIC METABOLIC PNL TOTAL CA: CPT

## 2025-09-09 PROCEDURE — G0463: CPT

## 2025-09-09 PROCEDURE — 85027 COMPLETE CBC AUTOMATED: CPT

## 2025-09-09 PROCEDURE — 80061 LIPID PANEL: CPT

## 2025-09-09 PROCEDURE — 83036 HEMOGLOBIN GLYCOSYLATED A1C: CPT

## 2025-09-09 PROCEDURE — 84443 ASSAY THYROID STIM HORMONE: CPT

## 2025-09-09 PROCEDURE — 84154 ASSAY OF PSA FREE: CPT

## 2025-09-09 PROCEDURE — 84153 ASSAY OF PSA TOTAL: CPT

## 2025-09-09 PROCEDURE — 36415 COLL VENOUS BLD VENIPUNCTURE: CPT

## 2025-09-10 ENCOUNTER — NON-APPOINTMENT (OUTPATIENT)
Age: 50
End: 2025-09-10

## 2025-09-10 VITALS — WEIGHT: 148 LBS | BODY MASS INDEX: 26.22 KG/M2 | HEIGHT: 63 IN

## 2025-09-10 DIAGNOSIS — Z87.891 PERSONAL HISTORY OF NICOTINE DEPENDENCE: ICD-10-CM

## 2025-09-15 ENCOUNTER — RESULT REVIEW (OUTPATIENT)
Age: 50
End: 2025-09-15

## 2025-09-15 ENCOUNTER — APPOINTMENT (OUTPATIENT)
Dept: CT IMAGING | Facility: CLINIC | Age: 50
End: 2025-09-15
Payer: COMMERCIAL

## 2025-09-15 PROCEDURE — 71271 CT THORAX LUNG CANCER SCR C-: CPT | Mod: 26
